# Patient Record
Sex: FEMALE | Race: OTHER | HISPANIC OR LATINO
[De-identification: names, ages, dates, MRNs, and addresses within clinical notes are randomized per-mention and may not be internally consistent; named-entity substitution may affect disease eponyms.]

---

## 2017-02-23 ENCOUNTER — APPOINTMENT (OUTPATIENT)
Dept: FAMILY MEDICINE | Facility: CLINIC | Age: 52
End: 2017-02-23

## 2017-04-10 ENCOUNTER — LABORATORY RESULT (OUTPATIENT)
Age: 52
End: 2017-04-10

## 2017-04-10 ENCOUNTER — APPOINTMENT (OUTPATIENT)
Dept: FAMILY MEDICINE | Facility: CLINIC | Age: 52
End: 2017-04-10

## 2017-04-10 VITALS
BODY MASS INDEX: 37.76 KG/M2 | HEART RATE: 62 BPM | SYSTOLIC BLOOD PRESSURE: 167 MMHG | TEMPERATURE: 97.9 F | WEIGHT: 175 LBS | DIASTOLIC BLOOD PRESSURE: 79 MMHG | HEIGHT: 57 IN | OXYGEN SATURATION: 97 %

## 2017-04-10 DIAGNOSIS — M25.561 PAIN IN RIGHT KNEE: ICD-10-CM

## 2017-04-10 DIAGNOSIS — G89.29 PAIN IN RIGHT KNEE: ICD-10-CM

## 2017-04-13 ENCOUNTER — LABORATORY RESULT (OUTPATIENT)
Age: 52
End: 2017-04-13

## 2017-04-13 ENCOUNTER — RX RENEWAL (OUTPATIENT)
Age: 52
End: 2017-04-13

## 2017-04-13 DIAGNOSIS — E55.9 VITAMIN D DEFICIENCY, UNSPECIFIED: ICD-10-CM

## 2017-04-13 LAB
25(OH)D3 SERPL-MCNC: 16.1 NG/ML
ALBUMIN SERPL ELPH-MCNC: 4.1 G/DL
ALP BLD-CCNC: 65 U/L
ALT SERPL-CCNC: 41 U/L
ANION GAP SERPL CALC-SCNC: 16 MMOL/L
APPEARANCE: CLEAR
AST SERPL-CCNC: 32 U/L
BASOPHILS # BLD AUTO: 0.05 K/UL
BASOPHILS NFR BLD AUTO: 1 %
BILIRUB SERPL-MCNC: 0.5 MG/DL
BILIRUBIN URINE: NEGATIVE
BLOOD URINE: ABNORMAL
BUN SERPL-MCNC: 18 MG/DL
CALCIUM SERPL-MCNC: 8.9 MG/DL
CHLORIDE SERPL-SCNC: 102 MMOL/L
CHOLEST SERPL-MCNC: 195 MG/DL
CHOLEST/HDLC SERPL: 3.1 RATIO
CO2 SERPL-SCNC: 22 MMOL/L
COLOR: YELLOW
CREAT SERPL-MCNC: 0.81 MG/DL
EOSINOPHIL # BLD AUTO: 0.09 K/UL
EOSINOPHIL NFR BLD AUTO: 1.9 %
GLUCOSE QUALITATIVE U: NORMAL MG/DL
GLUCOSE SERPL-MCNC: 106 MG/DL
HCT VFR BLD CALC: 39.2 %
HDLC SERPL-MCNC: 63 MG/DL
HGB BLD-MCNC: 12.9 G/DL
IMM GRANULOCYTES NFR BLD AUTO: 0.2 %
KETONES URINE: NEGATIVE
LDLC SERPL CALC-MCNC: 119 MG/DL
LEUKOCYTE ESTERASE URINE: NEGATIVE
LYMPHOCYTES # BLD AUTO: 2.07 K/UL
LYMPHOCYTES NFR BLD AUTO: 43 %
MAN DIFF?: NORMAL
MCHC RBC-ENTMCNC: 29.4 PG
MCHC RBC-ENTMCNC: 32.9 GM/DL
MCV RBC AUTO: 89.3 FL
MONOCYTES # BLD AUTO: 0.29 K/UL
MONOCYTES NFR BLD AUTO: 6 %
NEUTROPHILS # BLD AUTO: 2.3 K/UL
NEUTROPHILS NFR BLD AUTO: 47.9 %
NITRITE URINE: NEGATIVE
PH URINE: 7
PLATELET # BLD AUTO: 208 K/UL
POTASSIUM SERPL-SCNC: 4.1 MMOL/L
PROT SERPL-MCNC: 7 G/DL
PROTEIN URINE: NEGATIVE MG/DL
RBC # BLD: 4.39 M/UL
RBC # FLD: 14.7 %
SODIUM SERPL-SCNC: 140 MMOL/L
SPECIFIC GRAVITY URINE: 1.02
TRIGL SERPL-MCNC: 65 MG/DL
TSH SERPL-ACNC: 2.63 UIU/ML
UROBILINOGEN URINE: NORMAL MG/DL
WBC # FLD AUTO: 4.81 K/UL

## 2017-05-16 LAB
APPEARANCE: CLEAR
BILIRUBIN URINE: NEGATIVE
BLOOD URINE: ABNORMAL
COLOR: YELLOW
GLUCOSE QUALITATIVE U: NORMAL MG/DL
KETONES URINE: NEGATIVE
LEUKOCYTE ESTERASE URINE: NEGATIVE
NITRITE URINE: NEGATIVE
PH URINE: 6
PROTEIN URINE: NEGATIVE MG/DL
SPECIFIC GRAVITY URINE: 1.02
UROBILINOGEN URINE: 1 MG/DL

## 2017-07-07 ENCOUNTER — APPOINTMENT (OUTPATIENT)
Dept: FAMILY MEDICINE | Facility: CLINIC | Age: 52
End: 2017-07-07

## 2017-07-07 VITALS
TEMPERATURE: 98.3 F | DIASTOLIC BLOOD PRESSURE: 84 MMHG | BODY MASS INDEX: 38.83 KG/M2 | HEIGHT: 57 IN | WEIGHT: 180 LBS | HEART RATE: 68 BPM | OXYGEN SATURATION: 97 % | SYSTOLIC BLOOD PRESSURE: 155 MMHG

## 2017-07-07 VITALS — DIASTOLIC BLOOD PRESSURE: 80 MMHG | SYSTOLIC BLOOD PRESSURE: 140 MMHG

## 2017-07-07 DIAGNOSIS — Z87.440 PERSONAL HISTORY OF URINARY (TRACT) INFECTIONS: ICD-10-CM

## 2017-07-10 LAB
BACTERIA UR CULT: NORMAL
BILIRUB UR QL STRIP: NEGATIVE
GLUCOSE UR-MCNC: NEGATIVE
HCG UR QL: 0.2 EU/DL
HGB UR QL STRIP.AUTO: NORMAL
KETONES UR-MCNC: NEGATIVE
LEUKOCYTE ESTERASE UR QL STRIP: NEGATIVE
NITRITE UR QL STRIP: NEGATIVE
PH UR STRIP: 5
PROT UR STRIP-MCNC: NEGATIVE
SP GR UR STRIP: 1

## 2018-01-29 ENCOUNTER — APPOINTMENT (OUTPATIENT)
Dept: FAMILY MEDICINE | Facility: CLINIC | Age: 53
End: 2018-01-29
Payer: COMMERCIAL

## 2018-01-29 VITALS
HEART RATE: 65 BPM | TEMPERATURE: 98 F | SYSTOLIC BLOOD PRESSURE: 181 MMHG | BODY MASS INDEX: 38.62 KG/M2 | WEIGHT: 179 LBS | DIASTOLIC BLOOD PRESSURE: 84 MMHG | OXYGEN SATURATION: 97 % | HEIGHT: 57 IN

## 2018-01-29 VITALS — DIASTOLIC BLOOD PRESSURE: 94 MMHG | SYSTOLIC BLOOD PRESSURE: 182 MMHG

## 2018-01-29 PROCEDURE — 99214 OFFICE O/P EST MOD 30 MIN: CPT

## 2018-01-29 RX ORDER — CELECOXIB 200 MG/1
200 CAPSULE ORAL DAILY
Qty: 90 | Refills: 3 | Status: DISCONTINUED | COMMUNITY
Start: 2017-04-10 | End: 2018-01-29

## 2018-02-07 ENCOUNTER — APPOINTMENT (OUTPATIENT)
Dept: FAMILY MEDICINE | Facility: CLINIC | Age: 53
End: 2018-02-07
Payer: COMMERCIAL

## 2018-02-07 VITALS
BODY MASS INDEX: 38.83 KG/M2 | OXYGEN SATURATION: 99 % | HEART RATE: 76 BPM | WEIGHT: 180 LBS | SYSTOLIC BLOOD PRESSURE: 160 MMHG | DIASTOLIC BLOOD PRESSURE: 72 MMHG | TEMPERATURE: 97.7 F | HEIGHT: 57 IN

## 2018-02-07 PROCEDURE — G0008: CPT

## 2018-02-07 PROCEDURE — 90686 IIV4 VACC NO PRSV 0.5 ML IM: CPT

## 2018-02-07 PROCEDURE — 99213 OFFICE O/P EST LOW 20 MIN: CPT | Mod: 25

## 2018-03-23 ENCOUNTER — RX RENEWAL (OUTPATIENT)
Age: 53
End: 2018-03-23

## 2018-04-26 ENCOUNTER — APPOINTMENT (OUTPATIENT)
Dept: CARDIOLOGY | Facility: CLINIC | Age: 53
End: 2018-04-26
Payer: COMMERCIAL

## 2018-04-26 VITALS
HEIGHT: 55 IN | BODY MASS INDEX: 41.66 KG/M2 | DIASTOLIC BLOOD PRESSURE: 90 MMHG | SYSTOLIC BLOOD PRESSURE: 150 MMHG | HEART RATE: 62 BPM | WEIGHT: 180 LBS

## 2018-04-26 PROCEDURE — 93224 XTRNL ECG REC UP TO 48 HRS: CPT

## 2018-04-26 PROCEDURE — 93000 ELECTROCARDIOGRAM COMPLETE: CPT | Mod: 58

## 2018-04-26 PROCEDURE — 99204 OFFICE O/P NEW MOD 45 MIN: CPT

## 2018-06-01 ENCOUNTER — LABORATORY RESULT (OUTPATIENT)
Age: 53
End: 2018-06-01

## 2018-06-01 ENCOUNTER — APPOINTMENT (OUTPATIENT)
Dept: FAMILY MEDICINE | Facility: CLINIC | Age: 53
End: 2018-06-01
Payer: COMMERCIAL

## 2018-06-01 VITALS
OXYGEN SATURATION: 98 % | BODY MASS INDEX: 54.01 KG/M2 | DIASTOLIC BLOOD PRESSURE: 79 MMHG | HEART RATE: 66 BPM | TEMPERATURE: 98.4 F | WEIGHT: 177 LBS | SYSTOLIC BLOOD PRESSURE: 173 MMHG

## 2018-06-01 PROCEDURE — 99396 PREV VISIT EST AGE 40-64: CPT | Mod: 25

## 2018-06-01 PROCEDURE — 36415 COLL VENOUS BLD VENIPUNCTURE: CPT

## 2018-06-01 PROCEDURE — G0444 DEPRESSION SCREEN ANNUAL: CPT

## 2018-06-01 PROCEDURE — G0447 BEHAVIOR COUNSEL OBESITY 15M: CPT

## 2018-06-01 RX ORDER — LOSARTAN POTASSIUM 50 MG/1
50 TABLET, FILM COATED ORAL
Qty: 30 | Refills: 0 | Status: DISCONTINUED | COMMUNITY
Start: 2018-01-29 | End: 2018-06-01

## 2018-06-01 NOTE — HISTORY OF PRESENT ILLNESS
[FreeTextEntry1] : Annual physical. [de-identified] : 51 y/o HF, originally from [facility], presents today for annual physical.\par \par Pt w/ hx HTN on losartan, recently evaluated by cardiology due to CP and palpitations. Pt had Holter: WNL.\par \par Pt w/ Depression. States has chronic insomnia, and takes Rivot[place] that brings from her country.\par Pt is poor historian.

## 2018-06-01 NOTE — HEALTH RISK ASSESSMENT
[Poor] : ~his/her~ mood as  poor [No falls in past year] : Patient reported no falls in the past year [3] : 2) Feeling down, depressed, or hopeless for nearly every day (3) [Patient reported PAP Smear was normal] : Patient reported PAP Smear was normal [Patient reported colonoscopy was normal] : Patient reported colonoscopy was normal [HIV test declined] : HIV test declined [Hepatitis C test offered] : Hepatitis C test offered [Language] : difficulty with language [Reasoning] : difficulty with reasoning [Access to Medications] : access to medications [With Family] : lives with family [Employed] : employed [] :  [# Of Children ___] : has [unfilled] children [Feels Safe at Home] : Feels safe at home [Fully functional (bathing, dressing, toileting, transferring, walking, feeding)] : Fully functional (bathing, dressing, toileting, transferring, walking, feeding) [Fully functional (using the telephone, shopping, preparing meals, housekeeping, doing laundry, using] : Fully functional and needs no help or supervision to perform IADLs (using the telephone, shopping, preparing meals, housekeeping, doing laundry, using transportation, managing medications and managing finances) [Smoke Detector] : smoke detector [Seat Belt] :  uses seat belt [] : No [Change in mental status noted] : No change in mental status noted [Behavior] : denies difficulty with behavior [Handling Complex Tasks] : denies difficulty handling complex tasks [Sexually Active] : not sexually active [Reports changes in hearing] : Reports no changes in hearing [Reports changes in vision] : Reports no changes in vision [Reports changes in dental health] : Reports no changes in dental health [PapSmearDate] : 2015 [ColonoscopyDate] : 2016 [ColonoscopyComments] : Dr Carrero [HIVComments] : States was done 5 years ago, negative. 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

## 2018-06-01 NOTE — COUNSELING
[Weight management counseling provided] : Weight management [Healthy eating counseling provided] : healthy eating [Activity counseling provided] : activity [Behavioral health counseling provided] : behavioral health  [Keep Food Diary] : Keep food diary [Decrease Portions] : Decrease food portions [Low Salt Diet] : Low salt diet [Walking] : Walking [None] : None [Good understanding] : Patient has a good understanding of lifestyle changes and the steps needed to achieve self management goals [ - Annual Depression Screening] : Annual Depression Screening [ - Behavioral Counseling for Obesity (Face-to-Face for 15 Minutes)] : Behavioral Counseling for Obesity (Face-to-Face for 15 Minutes)

## 2018-06-01 NOTE — PAST MEDICAL HISTORY
[Postmenopausal] : postmenopausal [Menopause Age____] : age at menopause was [unfilled] [Total Preg ___] : G[unfilled] [Living ___] : Living: [unfilled]

## 2018-06-01 NOTE — REVIEW OF SYSTEMS
[Insomnia] : insomnia [Anxiety] : anxiety [Depression] : depression [Heartburn] : heartburn [Suicidal] : not suicidal

## 2018-06-01 NOTE — ASSESSMENT
[FreeTextEntry1] : 53 y/o HF, from [facility], poor historian and poor comprehension:\par \par CPE: blood and UA today.\par \par HIV screening: refused\par \par HC screening\par \par HTN: not well control. D/C losartan 50mg. Start Losratan-HCTZ 50-12.5mg.\par \par GERD: on omeprazole prn. Diet advise.\par \par Obesity: TLC. Diet counseling and guidance. Counseling was provided in diet and exercise. TLC d/w pt in details, regarding benefits of low calories and portion control. Spend > 15 minJun 01, 2018  10:00\par \par \par Gyn: pt will schedule appointment.\par \par Colonoscopy: 2016.\par \par Depression: Counseling and guidance. Start Sertraline. Will f/up in 1  month.

## 2018-06-08 LAB
25(OH)D3 SERPL-MCNC: 16.6 NG/ML
ALBUMIN SERPL ELPH-MCNC: 4.1 G/DL
ALP BLD-CCNC: 62 U/L
ALT SERPL-CCNC: 47 U/L
ANION GAP SERPL CALC-SCNC: 13 MMOL/L
APPEARANCE: CLEAR
AST SERPL-CCNC: 37 U/L
BASOPHILS # BLD AUTO: 0.04 K/UL
BASOPHILS NFR BLD AUTO: 0.9 %
BILIRUB SERPL-MCNC: 0.6 MG/DL
BILIRUBIN URINE: NEGATIVE
BLOOD URINE: ABNORMAL
BUN SERPL-MCNC: 15 MG/DL
CALCIUM SERPL-MCNC: 9.8 MG/DL
CHLORIDE SERPL-SCNC: 102 MMOL/L
CHOLEST SERPL-MCNC: 193 MG/DL
CHOLEST/HDLC SERPL: 2.9 RATIO
CO2 SERPL-SCNC: 25 MMOL/L
COLOR: YELLOW
CREAT SERPL-MCNC: 0.71 MG/DL
EOSINOPHIL # BLD AUTO: 0.05 K/UL
EOSINOPHIL NFR BLD AUTO: 1.1 %
GLUCOSE QUALITATIVE U: NEGATIVE MG/DL
GLUCOSE SERPL-MCNC: 106 MG/DL
HCT VFR BLD CALC: 40.6 %
HCV AB SER QL: NONREACTIVE
HCV S/CO RATIO: 0.14 S/CO
HDLC SERPL-MCNC: 67 MG/DL
HGB BLD-MCNC: 13.3 G/DL
IMM GRANULOCYTES NFR BLD AUTO: 0 %
KETONES URINE: NEGATIVE
LDLC SERPL CALC-MCNC: 114 MG/DL
LEUKOCYTE ESTERASE URINE: NEGATIVE
LYMPHOCYTES # BLD AUTO: 1.89 K/UL
LYMPHOCYTES NFR BLD AUTO: 42.2 %
MAN DIFF?: NORMAL
MCHC RBC-ENTMCNC: 29.2 PG
MCHC RBC-ENTMCNC: 32.8 GM/DL
MCV RBC AUTO: 89 FL
MONOCYTES # BLD AUTO: 0.27 K/UL
MONOCYTES NFR BLD AUTO: 6 %
NEUTROPHILS # BLD AUTO: 2.23 K/UL
NEUTROPHILS NFR BLD AUTO: 49.8 %
NITRITE URINE: NEGATIVE
PH URINE: 6.5
PLATELET # BLD AUTO: 211 K/UL
POTASSIUM SERPL-SCNC: 4.5 MMOL/L
PROT SERPL-MCNC: 7.7 G/DL
PROTEIN URINE: NEGATIVE MG/DL
RBC # BLD: 4.56 M/UL
RBC # FLD: 14.5 %
SODIUM SERPL-SCNC: 140 MMOL/L
SPECIFIC GRAVITY URINE: 1.02
TRIGL SERPL-MCNC: 61 MG/DL
TSH SERPL-ACNC: 1.27 UIU/ML
UROBILINOGEN URINE: NEGATIVE MG/DL
WBC # FLD AUTO: 4.48 K/UL

## 2018-06-18 ENCOUNTER — APPOINTMENT (OUTPATIENT)
Dept: FAMILY MEDICINE | Facility: CLINIC | Age: 53
End: 2018-06-18
Payer: COMMERCIAL

## 2018-06-18 VITALS
OXYGEN SATURATION: 98 % | DIASTOLIC BLOOD PRESSURE: 82 MMHG | WEIGHT: 176 LBS | BODY MASS INDEX: 37.97 KG/M2 | HEART RATE: 66 BPM | HEIGHT: 57 IN | SYSTOLIC BLOOD PRESSURE: 143 MMHG | TEMPERATURE: 98.4 F

## 2018-06-18 DIAGNOSIS — Z01.419 ENCOUNTER FOR GYNECOLOGICAL EXAMINATION (GENERAL) (ROUTINE) W/OUT ABNORMAL FINDINGS: ICD-10-CM

## 2018-06-18 PROCEDURE — 99214 OFFICE O/P EST MOD 30 MIN: CPT | Mod: 25

## 2018-06-18 RX ORDER — ERGOCALCIFEROL 1.25 MG/1
1.25 MG CAPSULE, LIQUID FILLED ORAL
Qty: 12 | Refills: 0 | Status: ACTIVE | COMMUNITY
Start: 2017-04-13 | End: 1900-01-01

## 2018-06-20 LAB — HPV HIGH+LOW RISK DNA PNL CVX: NOT DETECTED

## 2018-06-27 DIAGNOSIS — Z86.19 PERSONAL HISTORY OF OTHER INFECTIOUS AND PARASITIC DISEASES: ICD-10-CM

## 2018-06-27 LAB — CYTOLOGY CVX/VAG DOC THIN PREP: NORMAL

## 2018-09-28 ENCOUNTER — RX RENEWAL (OUTPATIENT)
Age: 53
End: 2018-09-28

## 2018-10-10 ENCOUNTER — APPOINTMENT (OUTPATIENT)
Dept: FAMILY MEDICINE | Facility: CLINIC | Age: 53
End: 2018-10-10
Payer: COMMERCIAL

## 2018-10-10 ENCOUNTER — LABORATORY RESULT (OUTPATIENT)
Age: 53
End: 2018-10-10

## 2018-10-10 ENCOUNTER — MED ADMIN CHARGE (OUTPATIENT)
Age: 53
End: 2018-10-10

## 2018-10-10 VITALS
WEIGHT: 176 LBS | SYSTOLIC BLOOD PRESSURE: 120 MMHG | OXYGEN SATURATION: 99 % | HEART RATE: 65 BPM | BODY MASS INDEX: 37.97 KG/M2 | TEMPERATURE: 97.9 F | HEIGHT: 57 IN | DIASTOLIC BLOOD PRESSURE: 74 MMHG

## 2018-10-10 DIAGNOSIS — R53.83 OTHER MALAISE: ICD-10-CM

## 2018-10-10 DIAGNOSIS — R53.81 OTHER MALAISE: ICD-10-CM

## 2018-10-10 PROCEDURE — 90686 IIV4 VACC NO PRSV 0.5 ML IM: CPT

## 2018-10-10 PROCEDURE — 36415 COLL VENOUS BLD VENIPUNCTURE: CPT

## 2018-10-10 PROCEDURE — 99214 OFFICE O/P EST MOD 30 MIN: CPT | Mod: 25

## 2018-10-10 PROCEDURE — G0008: CPT

## 2018-10-10 RX ORDER — FLUCONAZOLE 150 MG/1
150 TABLET ORAL
Qty: 1 | Refills: 0 | Status: DISCONTINUED | COMMUNITY
Start: 2018-06-27 | End: 2018-10-10

## 2018-10-10 NOTE — REVIEW OF SYSTEMS
[Fatigue] : fatigue [Muscle Weakness] : muscle weakness [Headache] : headache [Dizziness] : dizziness [Confusion] : confusion [Fainting] : no fainting [Unsteady Walking] : no ataxia [FreeTextEntry9] : cervicalgia

## 2018-10-10 NOTE — HISTORY OF PRESENT ILLNESS
[FreeTextEntry1] : Not feeling well [de-identified] : Pt presents today c/o not feeling well. Tired, fatigue, blurred vision, and pain in shoulders and cervical area.Reports body aches and pain in Rt knee.\par Pt states has palpitations, and feels occasional dizzy.\par States had OTC med for pain with mild relived of symptoms.\par Feels ltired, weakness and needs to sleep all the time.\par Pt denies recent travel or sick contacts.\par \par Pt states symptoms are present for aprox 1 week.\par Similar episodes in the past, but reports this time is worse.

## 2018-10-10 NOTE — ASSESSMENT
[FreeTextEntry1] : 53 y/o HF, originally from [facility], poor historian, presents today not feeling well overall;\par \par -Complete blood tests to R/O Rheumatologic or organic disorder.\par -EKG: NSR\par \par Microscopic hematuria;\par -referral was given to urology. never done.\par -New referral today\par \par HTN;\par -on losartan\par \par GERD; \par -omeprazole\par \par Depression;\par -sertaline\par \par Gyn; 2018\par \par Colonsocopy;2016\par \par Mammography; referral.\par \par Flu shot today.\par \par

## 2018-10-11 LAB
ALBUMIN SERPL ELPH-MCNC: 4.8 G/DL
ALP BLD-CCNC: 54 U/L
ALT SERPL-CCNC: 45 U/L
ANION GAP SERPL CALC-SCNC: 12 MMOL/L
AST SERPL-CCNC: 36 U/L
B BURGDOR IGG+IGM SER QL IB: NORMAL
BASOPHILS # BLD AUTO: 0.05 K/UL
BASOPHILS NFR BLD AUTO: 1 %
BILIRUB SERPL-MCNC: 0.8 MG/DL
BUN SERPL-MCNC: 14 MG/DL
CALCIUM SERPL-MCNC: 9.5 MG/DL
CHLORIDE SERPL-SCNC: 103 MMOL/L
CO2 SERPL-SCNC: 28 MMOL/L
CREAT SERPL-MCNC: 0.73 MG/DL
CRP SERPL-MCNC: 0.28 MG/DL
EBV EA AB SER IA-ACNC: <5 U/ML
EBV EA AB TITR SER IF: POSITIVE
EBV EA IGG SER QL IA: 584 U/ML
EBV EA IGG SER-ACNC: NEGATIVE
EBV EA IGM SER IA-ACNC: NEGATIVE
EBV PATRN SPEC IB-IMP: NORMAL
EBV VCA IGG SER IA-ACNC: 293 U/ML
EBV VCA IGM SER QL IA: <10 U/ML
EOSINOPHIL # BLD AUTO: 0.06 K/UL
EOSINOPHIL NFR BLD AUTO: 1.2 %
EPSTEIN-BARR VIRUS CAPSID ANTIGEN IGG: POSITIVE
ERYTHROCYTE [SEDIMENTATION RATE] IN BLOOD BY WESTERGREN METHOD: 13 MM/HR
FOLATE SERPL-MCNC: >20 NG/ML
GLUCOSE SERPL-MCNC: 103 MG/DL
HCT VFR BLD CALC: 42.6 %
HGB BLD-MCNC: 14.1 G/DL
IMM GRANULOCYTES NFR BLD AUTO: 0.2 %
LYMPHOCYTES # BLD AUTO: 2.45 K/UL
LYMPHOCYTES NFR BLD AUTO: 48.9 %
MAN DIFF?: NORMAL
MCHC RBC-ENTMCNC: 30.8 PG
MCHC RBC-ENTMCNC: 33.1 GM/DL
MCV RBC AUTO: 93 FL
MONOCYTES # BLD AUTO: 0.33 K/UL
MONOCYTES NFR BLD AUTO: 6.6 %
NEUTROPHILS # BLD AUTO: 2.11 K/UL
NEUTROPHILS NFR BLD AUTO: 42.1 %
PLATELET # BLD AUTO: 203 K/UL
POTASSIUM SERPL-SCNC: 4.4 MMOL/L
PROT SERPL-MCNC: 7.8 G/DL
RBC # BLD: 4.58 M/UL
RBC # FLD: 14.9 %
RHEUMATOID FACT SER QL: <10 IU/ML
SODIUM SERPL-SCNC: 143 MMOL/L
TSH SERPL-ACNC: 1.33 UIU/ML
VIT B12 SERPL-MCNC: 693 PG/ML
WBC # FLD AUTO: 5.01 K/UL

## 2018-10-15 LAB — ANA SER IF-ACNC: NEGATIVE

## 2018-10-27 ENCOUNTER — APPOINTMENT (OUTPATIENT)
Dept: MAMMOGRAPHY | Facility: CLINIC | Age: 53
End: 2018-10-27
Payer: COMMERCIAL

## 2018-10-27 ENCOUNTER — OUTPATIENT (OUTPATIENT)
Dept: OUTPATIENT SERVICES | Facility: HOSPITAL | Age: 53
LOS: 1 days | End: 2018-10-27
Payer: COMMERCIAL

## 2018-10-27 DIAGNOSIS — Z00.8 ENCOUNTER FOR OTHER GENERAL EXAMINATION: ICD-10-CM

## 2018-10-27 PROCEDURE — 77063 BREAST TOMOSYNTHESIS BI: CPT | Mod: 26

## 2018-10-27 PROCEDURE — 77067 SCR MAMMO BI INCL CAD: CPT | Mod: 26

## 2018-10-27 PROCEDURE — 77063 BREAST TOMOSYNTHESIS BI: CPT

## 2018-10-27 PROCEDURE — 77067 SCR MAMMO BI INCL CAD: CPT

## 2018-10-29 ENCOUNTER — APPOINTMENT (OUTPATIENT)
Dept: FAMILY MEDICINE | Facility: CLINIC | Age: 53
End: 2018-10-29
Payer: COMMERCIAL

## 2018-10-29 VITALS
TEMPERATURE: 97.8 F | BODY MASS INDEX: 37.32 KG/M2 | HEIGHT: 57 IN | SYSTOLIC BLOOD PRESSURE: 157 MMHG | WEIGHT: 173 LBS | HEART RATE: 61 BPM | DIASTOLIC BLOOD PRESSURE: 81 MMHG | OXYGEN SATURATION: 98 %

## 2018-10-29 DIAGNOSIS — R53.83 OTHER MALAISE: ICD-10-CM

## 2018-10-29 DIAGNOSIS — M54.2 CERVICALGIA: ICD-10-CM

## 2018-10-29 DIAGNOSIS — G43.909 MIGRAINE, UNSPECIFIED, NOT INTRACTABLE, W/OUT STATUS MIGRAINOSUS: ICD-10-CM

## 2018-10-29 DIAGNOSIS — M54.9 DORSALGIA, UNSPECIFIED: ICD-10-CM

## 2018-10-29 DIAGNOSIS — R53.81 OTHER MALAISE: ICD-10-CM

## 2018-10-29 PROCEDURE — 99214 OFFICE O/P EST MOD 30 MIN: CPT

## 2018-10-29 NOTE — ASSESSMENT
[FreeTextEntry1] : 51 y/o HF, originally from [facility], poor historian, presents today not feeling well overall;\par \par -fatigue/ malaise: Fibromyalgia?\par Complete blood tests to R/O Rheumatologic or organic disorder.: all wnl.\par -Rheumatology referral.\par -EKG: NSR\par \par Microscopic hematuria;\par -referral was given to urology. never done.\par -New referral today\par \par HTN;\par -on losartan\par \par GERD; \par -omeprazole\par \par Depression;\par -sertraline\par \par Gyn; 2018\par \par Colonsocopy;2016\par \par Mammography; done 10/27/18\par \par Flu shot : up to date.\par \par Migraines: Start sumatriptan 25mg prn\par \par

## 2018-10-29 NOTE — HISTORY OF PRESENT ILLNESS
[FreeTextEntry1] : f/up in blood tests results [de-identified] : Pt presents today for f/up in blood tests results.\par persist  not feeling well. Tired, fatigue, blurred vision, and pain in shoulders and cervical area.Reports body aches and pain in Rt knee.\par Pt states has palpitations, and feels occasional dizzy.\par States had OTC med for pain with mild relived of symptoms.\par Feels tired, weakness and needs to sleep all the time.\par Pt denies recent travel or sick contacts.\par \par All this symptoms are presents > 1year. Increase with cold weather.\par Similar episodes in the past, but reports this time is worse.

## 2018-11-21 ENCOUNTER — APPOINTMENT (OUTPATIENT)
Dept: UROLOGY | Facility: CLINIC | Age: 53
End: 2018-11-21
Payer: COMMERCIAL

## 2018-11-21 VITALS
BODY MASS INDEX: 37.32 KG/M2 | SYSTOLIC BLOOD PRESSURE: 146 MMHG | WEIGHT: 173 LBS | RESPIRATION RATE: 16 BRPM | HEIGHT: 57 IN | TEMPERATURE: 98.1 F | DIASTOLIC BLOOD PRESSURE: 80 MMHG | HEART RATE: 68 BPM | OXYGEN SATURATION: 98 %

## 2018-11-21 PROCEDURE — 51798 US URINE CAPACITY MEASURE: CPT

## 2018-11-21 PROCEDURE — 99204 OFFICE O/P NEW MOD 45 MIN: CPT | Mod: 25

## 2018-11-23 LAB
APPEARANCE: CLEAR
BACTERIA UR CULT: NORMAL
BACTERIA: NEGATIVE
BILIRUBIN URINE: NEGATIVE
BLOOD URINE: ABNORMAL
COLOR: YELLOW
GLUCOSE QUALITATIVE U: NEGATIVE MG/DL
HYALINE CASTS: 1 /LPF
KETONES URINE: NEGATIVE
LEUKOCYTE ESTERASE URINE: ABNORMAL
MICROSCOPIC-UA: NORMAL
NITRITE URINE: NEGATIVE
PH URINE: 6
PROTEIN URINE: NEGATIVE MG/DL
RED BLOOD CELLS URINE: 1 /HPF
SPECIFIC GRAVITY URINE: 1.01
SQUAMOUS EPITHELIAL CELLS: 2 /HPF
UROBILINOGEN URINE: NEGATIVE MG/DL
WHITE BLOOD CELLS URINE: 2 /HPF

## 2018-12-05 ENCOUNTER — APPOINTMENT (OUTPATIENT)
Dept: UROLOGY | Facility: CLINIC | Age: 53
End: 2018-12-05
Payer: COMMERCIAL

## 2018-12-05 DIAGNOSIS — R31.29 OTHER MICROSCOPIC HEMATURIA: ICD-10-CM

## 2018-12-05 PROCEDURE — 52000 CYSTOURETHROSCOPY: CPT

## 2018-12-09 ENCOUNTER — FORM ENCOUNTER (OUTPATIENT)
Age: 53
End: 2018-12-09

## 2018-12-10 ENCOUNTER — OUTPATIENT (OUTPATIENT)
Dept: OUTPATIENT SERVICES | Facility: HOSPITAL | Age: 53
LOS: 1 days | End: 2018-12-10

## 2018-12-10 ENCOUNTER — APPOINTMENT (OUTPATIENT)
Dept: ULTRASOUND IMAGING | Facility: CLINIC | Age: 53
End: 2018-12-10
Payer: COMMERCIAL

## 2018-12-10 DIAGNOSIS — R31.29 OTHER MICROSCOPIC HEMATURIA: ICD-10-CM

## 2018-12-10 PROCEDURE — 76830 TRANSVAGINAL US NON-OB: CPT | Mod: 26

## 2018-12-10 PROCEDURE — 76856 US EXAM PELVIC COMPLETE: CPT | Mod: 26

## 2018-12-10 PROCEDURE — 76775 US EXAM ABDO BACK WALL LIM: CPT | Mod: 26

## 2019-01-25 ENCOUNTER — RX RENEWAL (OUTPATIENT)
Age: 54
End: 2019-01-25

## 2019-02-04 ENCOUNTER — APPOINTMENT (OUTPATIENT)
Dept: FAMILY MEDICINE | Facility: CLINIC | Age: 54
End: 2019-02-04
Payer: COMMERCIAL

## 2019-02-04 VITALS
WEIGHT: 175 LBS | HEIGHT: 57 IN | DIASTOLIC BLOOD PRESSURE: 80 MMHG | HEART RATE: 80 BPM | OXYGEN SATURATION: 98 % | BODY MASS INDEX: 37.76 KG/M2 | TEMPERATURE: 98 F | SYSTOLIC BLOOD PRESSURE: 135 MMHG

## 2019-02-04 DIAGNOSIS — N81.4 UTEROVAGINAL PROLAPSE, UNSPECIFIED: ICD-10-CM

## 2019-02-04 PROCEDURE — 99213 OFFICE O/P EST LOW 20 MIN: CPT

## 2019-02-04 NOTE — HISTORY OF PRESENT ILLNESS
[FreeTextEntry1] : f/up after seen by urology [de-identified] : Pt is a poor hx and difficulty to verbalize.\par Pt was seen by urology for evaluation of microscopic hematuria. Cystoscopy done  w/ eleavted PRV.\par Pt had USG: Endometrial thickening and Myomatous Uterus. Pt was advise to have gyn eval.\par persist  not feeling well. Tired, fatigue, blurred vision, and pain in shoulders and cervical area.Reports body aches and pain in left anterior chest.\par Pt states has palpitations, and feels occasional dizzy.\par States had OTC med for pain with mild relived of symptoms.\par All this symptoms are presents > 1year. Increase with cold weather.\par Similar episodes in the past, but reports this time is worse.\par Reports chronic constipation

## 2019-02-04 NOTE — ASSESSMENT
[FreeTextEntry1] : 53 y/o HF, originally from [facility], poor historian, presents today not feeling well overall;\par \par -fatigue/ malaise: Fibromyalgia?\par -Pending Rheumatology eval.\par -EKG (10/2018): NSR\par \par Microscopic hematuria;\par -Seen by urology.\par -Cystoscopy done, USG done and advise Gyn evaluation.\par \par Myomatous uterine/ Endometrial thickening:\par -Gyn referral.\par \par HTN;\par -on losartan\par \par GERD; \par -omeprazole\par \par Depression;\par -sertraline\par \par Chronic constipation:\par -On docusate.\par -secondary to Uterine Myoma?\par Gyn; 2018\par \par Colonoscopy;2016\par \par Mammography; done 10/27/18\par \par Flu shot : up to date.\par \par Migraines: Start sumatriptan 25mg prn\par \par

## 2019-02-24 ENCOUNTER — RX RENEWAL (OUTPATIENT)
Age: 54
End: 2019-02-24

## 2019-03-04 ENCOUNTER — RX RENEWAL (OUTPATIENT)
Age: 54
End: 2019-03-04

## 2019-06-05 ENCOUNTER — APPOINTMENT (OUTPATIENT)
Dept: UROLOGY | Facility: CLINIC | Age: 54
End: 2019-06-05

## 2019-06-17 ENCOUNTER — RX RENEWAL (OUTPATIENT)
Age: 54
End: 2019-06-17

## 2019-07-10 ENCOUNTER — RX RENEWAL (OUTPATIENT)
Age: 54
End: 2019-07-10

## 2019-10-07 ENCOUNTER — MED ADMIN CHARGE (OUTPATIENT)
Age: 54
End: 2019-10-07

## 2019-10-07 ENCOUNTER — APPOINTMENT (OUTPATIENT)
Dept: FAMILY MEDICINE | Facility: CLINIC | Age: 54
End: 2019-10-07
Payer: COMMERCIAL

## 2019-10-07 VITALS
DIASTOLIC BLOOD PRESSURE: 79 MMHG | HEIGHT: 57 IN | TEMPERATURE: 98.4 F | BODY MASS INDEX: 39.48 KG/M2 | OXYGEN SATURATION: 97 % | HEART RATE: 72 BPM | WEIGHT: 183 LBS | SYSTOLIC BLOOD PRESSURE: 118 MMHG

## 2019-10-07 PROCEDURE — 99396 PREV VISIT EST AGE 40-64: CPT | Mod: 25

## 2019-10-07 PROCEDURE — 93000 ELECTROCARDIOGRAM COMPLETE: CPT

## 2019-10-07 PROCEDURE — 36415 COLL VENOUS BLD VENIPUNCTURE: CPT

## 2019-10-07 PROCEDURE — G0008: CPT

## 2019-10-07 PROCEDURE — 90674 CCIIV4 VAC NO PRSV 0.5 ML IM: CPT

## 2019-10-07 RX ORDER — SERTRALINE 25 MG/1
25 TABLET, FILM COATED ORAL DAILY
Qty: 30 | Refills: 3 | Status: DISCONTINUED | COMMUNITY
Start: 2018-06-01 | End: 2019-10-07

## 2019-10-07 NOTE — PHYSICAL EXAM
[No Acute Distress] : no acute distress [Well Nourished] : well nourished [Well-Appearing] : well-appearing [Normal Sclera/Conjunctiva] : normal sclera/conjunctiva [Well Developed] : well developed [PERRL] : pupils equal round and reactive to light [EOMI] : extraocular movements intact [Normal Oropharynx] : the oropharynx was normal [Normal Outer Ear/Nose] : the outer ears and nose were normal in appearance [No JVD] : no jugular venous distention [No Lymphadenopathy] : no lymphadenopathy [Supple] : supple [Thyroid Normal, No Nodules] : the thyroid was normal and there were no nodules present [No Respiratory Distress] : no respiratory distress  [Normal Rate] : normal rate  [Clear to Auscultation] : lungs were clear to auscultation bilaterally [No Accessory Muscle Use] : no accessory muscle use [Normal S1, S2] : normal S1 and S2 [Regular Rhythm] : with a regular rhythm [No Abdominal Bruit] : a ~M bruit was not heard ~T in the abdomen [No Murmur] : no murmur heard [No Carotid Bruits] : no carotid bruits [Pedal Pulses Present] : the pedal pulses are present [No Varicosities] : no varicosities [No Edema] : there was no peripheral edema [No Extremity Clubbing/Cyanosis] : no extremity clubbing/cyanosis [No Palpable Aorta] : no palpable aorta [Soft] : abdomen soft [Non Tender] : non-tender [No Masses] : no abdominal mass palpated [Non-distended] : non-distended [No HSM] : no HSM [Normal Bowel Sounds] : normal bowel sounds [Normal Posterior Cervical Nodes] : no posterior cervical lymphadenopathy [Normal Anterior Cervical Nodes] : no anterior cervical lymphadenopathy [No CVA Tenderness] : no CVA  tenderness [No Spinal Tenderness] : no spinal tenderness [No Joint Swelling] : no joint swelling [No Rash] : no rash [Grossly Normal Strength/Tone] : grossly normal strength/tone [Normal Gait] : normal gait [No Focal Deficits] : no focal deficits [Coordination Grossly Intact] : coordination grossly intact [Deep Tendon Reflexes (DTR)] : deep tendon reflexes were 2+ and symmetric [Normal Affect] : the affect was normal [Normal Insight/Judgement] : insight and judgment were intact

## 2019-10-07 NOTE — HISTORY OF PRESENT ILLNESS
[FreeTextEntry1] : Annual physical. [de-identified] : 52 y/o HF, originally from [facility], presents today for annual physical.\par \par Pt w/ hx HTN on losartan. Pt had Holter 2018, seen by cardiology.: WNL.\par \par Pt is very poor historian.\par Pt was seen last year 11/2018 by urology for Recurrent UTI, pelvic prolapse and microscopic hematuria. Pt had USG and was advise to f/up with GYn, after abnormal Pelvic USG.

## 2019-10-07 NOTE — COUNSELING
[Weight management counseling provided] : Weight management [Healthy eating counseling provided] : healthy eating [Activity counseling provided] : activity [Keep Food Diary] : Keep food diary [Decrease Portions] : Decrease food portions [Low Salt Diet] : Low salt diet [None] : None [Walking] : Walking [Good understanding] : Patient has a good understanding of lifestyle changes and the steps needed to achieve self management goals [Fall prevention counseling provided] : Fall prevention counseling provided [Behavioral health counseling provided] : Behavioral health counseling provided [Other: ____] : [unfilled] [Needs reinforcement, provided] : Patient needs reinforcement on understanding of lifestyle changes and steps needed to achieve self management goal; reinforcement was provided

## 2019-10-07 NOTE — REVIEW OF SYSTEMS
[Heartburn] : heartburn [Anxiety] : anxiety [Insomnia] : insomnia [Depression] : depression [Suicidal] : not suicidal

## 2019-10-07 NOTE — HEALTH RISK ASSESSMENT
[Fair] :  ~his/her~ mood as fair [No] : No [No falls in past year] : Patient reported no falls in the past year [0] : 1) Little interest or pleasure doing things: Not at all (0) [Patient reported mammogram was normal] : Patient reported mammogram was normal [Patient reported PAP Smear was normal] : Patient reported PAP Smear was normal [Patient reported colonoscopy was normal] : Patient reported colonoscopy was normal [HIV test declined] : HIV test declined [Hepatitis C test offered] : Hepatitis C test offered [Language] : difficulty with language [Reasoning] : difficulty with reasoning [Access to Medications] : access to medications [With Family] : lives with family [Employed] : employed [] :  [# Of Children ___] : has [unfilled] children [Feels Safe at Home] : Feels safe at home [Fully functional (using the telephone, shopping, preparing meals, housekeeping, doing laundry, using] : Fully functional and needs no help or supervision to perform IADLs (using the telephone, shopping, preparing meals, housekeeping, doing laundry, using transportation, managing medications and managing finances) [Fully functional (bathing, dressing, toileting, transferring, walking, feeding)] : Fully functional (bathing, dressing, toileting, transferring, walking, feeding) [Seat Belt] :  uses seat belt [Smoke Detector] : smoke detector [Patient/Caregiver not ready to engage] : Patient/Caregiver not ready to engage [] : No [de-identified] : no [de-identified] : no [Change in mental status noted] : No change in mental status noted [Behavior] : denies difficulty with behavior [Handling Complex Tasks] : denies difficulty handling complex tasks [Sexually Active] : not sexually active [Reports changes in hearing] : Reports no changes in hearing [Reports changes in vision] : Reports no changes in vision [Reports changes in dental health] : Reports no changes in dental health [MammogramDate] : 10/2018 [PapSmearDate] : 06/2018 [BoneDensityComments] : never done [ColonoscopyDate] : 2016 [ColonoscopyComments] : Dr Carrero [HIVComments] : States was done 5 years ago, negative. 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

## 2019-10-14 LAB
25(OH)D3 SERPL-MCNC: 20.3 NG/ML
ALBUMIN SERPL ELPH-MCNC: 4.6 G/DL
ALP BLD-CCNC: 65 U/L
ALT SERPL-CCNC: 64 U/L
ANION GAP SERPL CALC-SCNC: 18 MMOL/L
AST SERPL-CCNC: 41 U/L
BASOPHILS # BLD AUTO: 0.05 K/UL
BASOPHILS NFR BLD AUTO: 1.1 %
BILIRUB SERPL-MCNC: 0.6 MG/DL
BUN SERPL-MCNC: 20 MG/DL
CALCIUM SERPL-MCNC: 10 MG/DL
CHLORIDE SERPL-SCNC: 102 MMOL/L
CHOLEST SERPL-MCNC: 201 MG/DL
CHOLEST/HDLC SERPL: 3.1 RATIO
CO2 SERPL-SCNC: 23 MMOL/L
CREAT SERPL-MCNC: 0.72 MG/DL
EOSINOPHIL # BLD AUTO: 0.07 K/UL
EOSINOPHIL NFR BLD AUTO: 1.5 %
GLUCOSE SERPL-MCNC: 142 MG/DL
HCT VFR BLD CALC: 42.4 %
HDLC SERPL-MCNC: 66 MG/DL
HGB BLD-MCNC: 13.9 G/DL
IMM GRANULOCYTES NFR BLD AUTO: 0.2 %
LDLC SERPL CALC-MCNC: 122 MG/DL
LYMPHOCYTES # BLD AUTO: 2.3 K/UL
LYMPHOCYTES NFR BLD AUTO: 49.6 %
MAN DIFF?: NORMAL
MCHC RBC-ENTMCNC: 30.8 PG
MCHC RBC-ENTMCNC: 32.8 GM/DL
MCV RBC AUTO: 93.8 FL
MONOCYTES # BLD AUTO: 0.29 K/UL
MONOCYTES NFR BLD AUTO: 6.3 %
NEUTROPHILS # BLD AUTO: 1.92 K/UL
NEUTROPHILS NFR BLD AUTO: 41.3 %
PLATELET # BLD AUTO: 217 K/UL
POTASSIUM SERPL-SCNC: 3.9 MMOL/L
PROT SERPL-MCNC: 7.3 G/DL
RBC # BLD: 4.52 M/UL
RBC # FLD: 14.9 %
SODIUM SERPL-SCNC: 143 MMOL/L
TRIGL SERPL-MCNC: 64 MG/DL
TSH SERPL-ACNC: 2.2 UIU/ML
WBC # FLD AUTO: 4.64 K/UL

## 2019-12-16 ENCOUNTER — APPOINTMENT (OUTPATIENT)
Dept: FAMILY MEDICINE | Facility: CLINIC | Age: 54
End: 2019-12-16
Payer: COMMERCIAL

## 2019-12-16 VITALS
TEMPERATURE: 98.3 F | HEART RATE: 54 BPM | WEIGHT: 169 LBS | BODY MASS INDEX: 36.46 KG/M2 | DIASTOLIC BLOOD PRESSURE: 78 MMHG | HEIGHT: 57 IN | SYSTOLIC BLOOD PRESSURE: 132 MMHG | OXYGEN SATURATION: 99 %

## 2019-12-16 LAB — HBA1C MFR BLD HPLC: 6.1

## 2019-12-16 PROCEDURE — 36415 COLL VENOUS BLD VENIPUNCTURE: CPT

## 2019-12-16 PROCEDURE — 99213 OFFICE O/P EST LOW 20 MIN: CPT | Mod: 25

## 2019-12-16 PROCEDURE — 83036 HEMOGLOBIN GLYCOSYLATED A1C: CPT | Mod: QW

## 2019-12-16 NOTE — COUNSELING
[Weight management counseling provided] : Weight management [Healthy eating counseling provided] : healthy eating [Activity counseling provided] : activity [None] : None [Good understanding] : Patient has a good understanding of lifestyle changes and the steps needed to achieve self management goals [Fall prevention counseling provided] : Fall prevention counseling provided [Behavioral health counseling provided] : Behavioral health counseling provided

## 2019-12-16 NOTE — ASSESSMENT
[FreeTextEntry1] : 54 y/o HF, originally from [facility], poor historian,\par \par Abnormal LFT's:\par -repeat LFT's today\par \par PreDM:\par -HbA1c: 6.1%\par -TLC\par \par -fatigue/ malaise: Fibromyalgia?\par -Pending Rheumatology eval.\par -EKG (10/2018): NSR\par \par Microscopic hematuria;\par -Seen by urology.\par -Cystoscopy done, USG done and advise Gyn evaluation.\par \par Myomatous uterine/ Endometrial thickening:\par -Gyn referral. Pending eval. New referral \par \par HTN;\par -on losartan\par \par GERD; \par -omeprazole\par \par Depression;\par -sertraline\par \par Chronic constipation:\par -On docusate.\par -secondary to Uterine Myoma?\par Gyn; 2018\par \par Colonoscopy;2016\par \par Mammography; done 10/27/18. New referral.\par \par Flu shot : up to date.\par \par Migraines: Start sumatriptan 25mg prn\par \par

## 2019-12-16 NOTE — PHYSICAL EXAM
[No Acute Distress] : no acute distress [Well Nourished] : well nourished [Well Developed] : well developed [Normal Sclera/Conjunctiva] : normal sclera/conjunctiva [Well-Appearing] : well-appearing [PERRL] : pupils equal round and reactive to light [EOMI] : extraocular movements intact [Normal Outer Ear/Nose] : the outer ears and nose were normal in appearance [No JVD] : no jugular venous distention [Normal Oropharynx] : the oropharynx was normal [No Lymphadenopathy] : no lymphadenopathy [Supple] : supple [No Respiratory Distress] : no respiratory distress  [Thyroid Normal, No Nodules] : the thyroid was normal and there were no nodules present [Clear to Auscultation] : lungs were clear to auscultation bilaterally [No Accessory Muscle Use] : no accessory muscle use [Normal Rate] : normal rate  [Regular Rhythm] : with a regular rhythm [No Murmur] : no murmur heard [Normal S1, S2] : normal S1 and S2 [No Abdominal Bruit] : a ~M bruit was not heard ~T in the abdomen [No Carotid Bruits] : no carotid bruits [No Varicosities] : no varicosities [Pedal Pulses Present] : the pedal pulses are present [No Edema] : there was no peripheral edema [No Extremity Clubbing/Cyanosis] : no extremity clubbing/cyanosis [No Palpable Aorta] : no palpable aorta [Soft] : abdomen soft [Non Tender] : non-tender [Non-distended] : non-distended [No Masses] : no abdominal mass palpated [No HSM] : no HSM [Normal Bowel Sounds] : normal bowel sounds [Normal Posterior Cervical Nodes] : no posterior cervical lymphadenopathy [Normal Anterior Cervical Nodes] : no anterior cervical lymphadenopathy [No CVA Tenderness] : no CVA  tenderness [No Spinal Tenderness] : no spinal tenderness [No Joint Swelling] : no joint swelling [Grossly Normal Strength/Tone] : grossly normal strength/tone [No Rash] : no rash [Normal Gait] : normal gait [Coordination Grossly Intact] : coordination grossly intact [No Focal Deficits] : no focal deficits [Deep Tendon Reflexes (DTR)] : deep tendon reflexes were 2+ and symmetric [Normal Affect] : the affect was normal [Normal Insight/Judgement] : insight and judgment were intact

## 2019-12-16 NOTE — REVIEW OF SYSTEMS
[Muscle Weakness] : muscle weakness [Fatigue] : fatigue [Headache] : headache [Dizziness] : dizziness [Confusion] : confusion [Fainting] : no fainting [FreeTextEntry9] : cervicalgia [Unsteady Walking] : no ataxia

## 2019-12-19 LAB
ALBUMIN SERPL ELPH-MCNC: 4.7 G/DL
ALP BLD-CCNC: 57 U/L
ALT SERPL-CCNC: 51 U/L
AST SERPL-CCNC: 59 U/L
BILIRUB DIRECT SERPL-MCNC: 0.2 MG/DL
BILIRUB INDIRECT SERPL-MCNC: 0.7 MG/DL
BILIRUB SERPL-MCNC: 0.8 MG/DL
PROT SERPL-MCNC: 7.6 G/DL

## 2020-02-28 ENCOUNTER — FORM ENCOUNTER (OUTPATIENT)
Age: 55
End: 2020-02-28

## 2020-02-29 ENCOUNTER — OUTPATIENT (OUTPATIENT)
Dept: OUTPATIENT SERVICES | Facility: HOSPITAL | Age: 55
LOS: 1 days | End: 2020-02-29
Payer: COMMERCIAL

## 2020-02-29 ENCOUNTER — APPOINTMENT (OUTPATIENT)
Dept: MAMMOGRAPHY | Facility: CLINIC | Age: 55
End: 2020-02-29
Payer: COMMERCIAL

## 2020-02-29 DIAGNOSIS — Z12.31 ENCOUNTER FOR SCREENING MAMMOGRAM FOR MALIGNANT NEOPLASM OF BREAST: ICD-10-CM

## 2020-02-29 PROCEDURE — 77063 BREAST TOMOSYNTHESIS BI: CPT

## 2020-02-29 PROCEDURE — 77067 SCR MAMMO BI INCL CAD: CPT | Mod: 26

## 2020-02-29 PROCEDURE — 77063 BREAST TOMOSYNTHESIS BI: CPT | Mod: 26

## 2020-02-29 PROCEDURE — 77067 SCR MAMMO BI INCL CAD: CPT

## 2020-06-25 ENCOUNTER — RX RENEWAL (OUTPATIENT)
Age: 55
End: 2020-06-25

## 2020-08-06 ENCOUNTER — RX RENEWAL (OUTPATIENT)
Age: 55
End: 2020-08-06

## 2020-08-14 ENCOUNTER — APPOINTMENT (OUTPATIENT)
Dept: FAMILY MEDICINE | Facility: CLINIC | Age: 55
End: 2020-08-14
Payer: COMMERCIAL

## 2020-08-14 ENCOUNTER — RESULT CHARGE (OUTPATIENT)
Age: 55
End: 2020-08-14

## 2020-08-14 VITALS
SYSTOLIC BLOOD PRESSURE: 120 MMHG | BODY MASS INDEX: 39.05 KG/M2 | WEIGHT: 181 LBS | HEIGHT: 57 IN | RESPIRATION RATE: 12 BRPM | DIASTOLIC BLOOD PRESSURE: 70 MMHG | OXYGEN SATURATION: 99 % | TEMPERATURE: 97.7 F | HEART RATE: 97 BPM

## 2020-08-14 LAB — HBA1C MFR BLD HPLC: 6.1

## 2020-08-14 PROCEDURE — 36415 COLL VENOUS BLD VENIPUNCTURE: CPT

## 2020-08-14 PROCEDURE — 83036 HEMOGLOBIN GLYCOSYLATED A1C: CPT | Mod: QW

## 2020-08-14 PROCEDURE — 99214 OFFICE O/P EST MOD 30 MIN: CPT | Mod: 25

## 2020-08-14 NOTE — REVIEW OF SYSTEMS
[Fatigue] : fatigue [Headache] : headache [Muscle Weakness] : muscle weakness [Dizziness] : dizziness [Confusion] : confusion [Fainting] : no fainting [Unsteady Walking] : no ataxia [FreeTextEntry9] : cervicalgia

## 2020-08-14 NOTE — ASSESSMENT
[FreeTextEntry1] : 54 y/o HF, originally from [facility], poor historian,\par \par Abnormal LFT's:\par -repeat LFT's today\par \par PreDM:\par -HbA1c: 6.1%\par -TLC\par \par -fatigue/ malaise: Fibromyalgia?\par -Pending Rheumatology eval.\par -EKG (10/2018): NSR\par \par Microscopic hematuria;\par -Seen by urology.\par -Cystoscopy done, USG done and advise Gyn evaluation.\par \par Myomatous uterine/ Endometrial thickening:\par -Gyn referral. Pending eval. New referral \par \par HTN;\par -on losartan-HCTZ\par \par GERD; \par -omeprazole\par \par Chronic constipation:\par -On docusate.\par -secondary to Uterine Myoma?\par Gyn; 2018\par \par Colonoscopy;2016\par \par Mammography; done 02/20.\par \par Flu shot : up to date.\par \par Migraines: Start sumatriptan 25mg prn\par \par F/up for annual physical.\par

## 2020-08-14 NOTE — COUNSELING
[Fall prevention counseling provided] : Fall prevention counseling provided [Weight management counseling provided] : Weight management [Healthy eating counseling provided] : healthy eating [Activity counseling provided] : activity [Behavioral health counseling provided] : behavioral health  [None] : None [Good understanding] : Patient has a good understanding of lifestyle changes and the steps needed to achieve self management goals

## 2020-08-14 NOTE — HISTORY OF PRESENT ILLNESS
[FreeTextEntry1] : Glucose check\par LFT's [de-identified] : Pt is a poor hx and difficulty to verbalize.\par Pt was seen by urology for evaluation of microscopic hematuria. Cystoscopy done .\par Pt with HTN on Losartan-HCTZ, GERD on omeprazole prn.\par Pt had USG: Endometrial thickening and Myomatous Uterus. Pt was advise to have gyn eval.and has pending evaluation.

## 2020-08-14 NOTE — PHYSICAL EXAM
[No Acute Distress] : no acute distress [Well Nourished] : well nourished [Well-Appearing] : well-appearing [Well Developed] : well developed [Normal Sclera/Conjunctiva] : normal sclera/conjunctiva [PERRL] : pupils equal round and reactive to light [Normal Outer Ear/Nose] : the outer ears and nose were normal in appearance [EOMI] : extraocular movements intact [Normal Oropharynx] : the oropharynx was normal [No JVD] : no jugular venous distention [Supple] : supple [No Lymphadenopathy] : no lymphadenopathy [Thyroid Normal, No Nodules] : the thyroid was normal and there were no nodules present [No Respiratory Distress] : no respiratory distress  [Clear to Auscultation] : lungs were clear to auscultation bilaterally [No Accessory Muscle Use] : no accessory muscle use [Normal Rate] : normal rate  [Regular Rhythm] : with a regular rhythm [Normal S1, S2] : normal S1 and S2 [No Murmur] : no murmur heard [No Carotid Bruits] : no carotid bruits [No Varicosities] : no varicosities [No Abdominal Bruit] : a ~M bruit was not heard ~T in the abdomen [Pedal Pulses Present] : the pedal pulses are present [No Extremity Clubbing/Cyanosis] : no extremity clubbing/cyanosis [No Edema] : there was no peripheral edema [No Palpable Aorta] : no palpable aorta [Non Tender] : non-tender [Soft] : abdomen soft [No Masses] : no abdominal mass palpated [Non-distended] : non-distended [No HSM] : no HSM [Normal Bowel Sounds] : normal bowel sounds [Normal Posterior Cervical Nodes] : no posterior cervical lymphadenopathy [Normal Anterior Cervical Nodes] : no anterior cervical lymphadenopathy [No Spinal Tenderness] : no spinal tenderness [No CVA Tenderness] : no CVA  tenderness [No Joint Swelling] : no joint swelling [Grossly Normal Strength/Tone] : grossly normal strength/tone [No Rash] : no rash [Coordination Grossly Intact] : coordination grossly intact [Normal Gait] : normal gait [No Focal Deficits] : no focal deficits [Normal Affect] : the affect was normal [Deep Tendon Reflexes (DTR)] : deep tendon reflexes were 2+ and symmetric [Normal Insight/Judgement] : insight and judgment were intact

## 2020-08-17 LAB
ALBUMIN SERPL ELPH-MCNC: 4.7 G/DL
ALP BLD-CCNC: 69 U/L
ALT SERPL-CCNC: 52 U/L
AST SERPL-CCNC: 37 U/L
BILIRUB DIRECT SERPL-MCNC: 0.1 MG/DL
BILIRUB INDIRECT SERPL-MCNC: 0.3 MG/DL
BILIRUB SERPL-MCNC: 0.4 MG/DL
PROT SERPL-MCNC: 7.3 G/DL

## 2020-10-12 ENCOUNTER — APPOINTMENT (OUTPATIENT)
Dept: FAMILY MEDICINE | Facility: CLINIC | Age: 55
End: 2020-10-12
Payer: COMMERCIAL

## 2020-10-12 ENCOUNTER — NON-APPOINTMENT (OUTPATIENT)
Age: 55
End: 2020-10-12

## 2020-10-12 VITALS
HEART RATE: 61 BPM | DIASTOLIC BLOOD PRESSURE: 68 MMHG | WEIGHT: 182 LBS | OXYGEN SATURATION: 98 % | BODY MASS INDEX: 36.69 KG/M2 | SYSTOLIC BLOOD PRESSURE: 110 MMHG | HEIGHT: 59 IN | TEMPERATURE: 97.6 F | RESPIRATION RATE: 14 BRPM

## 2020-10-12 PROCEDURE — G0008: CPT

## 2020-10-12 PROCEDURE — 99396 PREV VISIT EST AGE 40-64: CPT | Mod: 25

## 2020-10-12 PROCEDURE — 90686 IIV4 VACC NO PRSV 0.5 ML IM: CPT

## 2020-10-12 PROCEDURE — 93000 ELECTROCARDIOGRAM COMPLETE: CPT

## 2020-10-12 PROCEDURE — 36415 COLL VENOUS BLD VENIPUNCTURE: CPT

## 2020-10-12 RX ORDER — SUMATRIPTAN 25 MG/1
25 TABLET, FILM COATED ORAL
Qty: 15 | Refills: 2 | Status: DISCONTINUED | COMMUNITY
Start: 2018-10-29 | End: 2020-10-12

## 2020-10-12 RX ORDER — POLYETHYLENE GLYCOL 3350 17 G/17G
17 POWDER, FOR SOLUTION ORAL DAILY
Qty: 1 | Refills: 3 | Status: DISCONTINUED | COMMUNITY
Start: 2018-01-29 | End: 2020-10-12

## 2020-10-12 NOTE — REVIEW OF SYSTEMS
[Palpitations] : palpitations [Back Pain] : back pain [Heartburn] : no heartburn [Suicidal] : not suicidal [Insomnia] : no insomnia [Anxiety] : no anxiety [Depression] : no depression [FreeTextEntry9] : left leg pain

## 2020-10-12 NOTE — ASSESSMENT
[FreeTextEntry1] : 53 y/o HF, from [facility], poor historian and poor comprehension:\par \par CPE: blood and UA today.\par HIV screening: refused\par HC screenin negative\par \par HTN/ Atypical chest pain:\par -on Losartan-HCTZ 50-12.5mg.\par -EKG: Sinus Kulwinder/ Ectopic vent beats.\par -Cardiology referral.\par \par GERD: on omeprazole prn. Diet advise.\par \par Obesity: TLC. Diet counseling and guidance. Counseling was provided in diet and exercise. TLC d/w pt in details, regarding benefits of low calories and portion control. \par \par Microscopic hematuria:\par -seen by urology\par \par Abnormal pelvic USG: thickened endometrium/ uterine Myoma:\par -Gyn eval.with Dr Vee Walton\par \par Mammo: 2020\par Colonoscopy: 2016.\par Immunizations: flu shot today\par \par Left Sciatic pain:\par -Start medrol pack and methocarbamol\par -F/up prn\par

## 2020-10-12 NOTE — HISTORY OF PRESENT ILLNESS
[FreeTextEntry1] : Annual physical. [de-identified] : 55 y/o HF, originally from [facility], presents today for annual physical.\par \par Pt w/ hx HTN on losartan. Pt had Holter 2018, seen by cardiology.: WNL.\par \par Pt is very poor historian.\par PT reports pain in lower back with radiation to left leg for aprox 8 days. denies similar episodes in the past. States pain is 10/10 and reports pain when walking.Humphrey numbness in extremities.\par Reports palpitations and cough.

## 2020-10-12 NOTE — HEALTH RISK ASSESSMENT
[Fair] :  ~his/her~ mood as fair [No falls in past year] : Patient reported no falls in the past year [0] : 2) Feeling down, depressed, or hopeless: Not at all (0) [Patient reported mammogram was normal] : Patient reported mammogram was normal [Patient reported PAP Smear was normal] : Patient reported PAP Smear was normal [Patient reported colonoscopy was normal] : Patient reported colonoscopy was normal [HIV test declined] : HIV test declined [Language] : difficulty with language [Reasoning] : difficulty with reasoning [Access to Medications] : access to medications [With Family] : lives with family [Employed] : employed [] :  [# Of Children ___] : has [unfilled] children [Feels Safe at Home] : Feels safe at home [Fully functional (bathing, dressing, toileting, transferring, walking, feeding)] : Fully functional (bathing, dressing, toileting, transferring, walking, feeding) [Fully functional (using the telephone, shopping, preparing meals, housekeeping, doing laundry, using] : Fully functional and needs no help or supervision to perform IADLs (using the telephone, shopping, preparing meals, housekeeping, doing laundry, using transportation, managing medications and managing finances) [Smoke Detector] : smoke detector [Seat Belt] :  uses seat belt [No] : In the past 12 months have you used drugs other than those required for medical reasons? No [Hepatitis C test declined] : Hepatitis C test declined [With Patient/Caregiver] : With Patient/Caregiver [Designated Healthcare Proxy] : Designated healthcare proxy [Name: ___] : Health Care Proxy's Name: [unfilled]  [Relationship: ___] : Relationship: [unfilled] [] : No [de-identified] : no [de-identified] : no [Change in mental status noted] : No change in mental status noted [Behavior] : denies difficulty with behavior [Handling Complex Tasks] : denies difficulty handling complex tasks [Sexually Active] : not sexually active [Reports changes in hearing] : Reports no changes in hearing [Reports changes in vision] : Reports no changes in vision [Reports changes in dental health] : Reports no changes in dental health [MammogramDate] : 02/20 [PapSmearDate] : 06/2018 [BoneDensityComments] : never done [ColonoscopyDate] : 2016 [ColonoscopyComments] : Dr Carrero [HIVComments] : States was done 5 years ago, negative. 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

## 2020-10-12 NOTE — COUNSELING
[Fall prevention counseling provided] : Fall prevention counseling provided [Behavioral health counseling provided] : Behavioral health counseling provided [Other: ____] : [unfilled] [Needs reinforcement, provided] : Patient needs reinforcement on understanding of lifestyle changes and steps needed to achieve self management goal; reinforcement was provided [None] : None

## 2020-10-13 LAB — 25(OH)D3 SERPL-MCNC: 20.6 NG/ML

## 2020-10-14 LAB
ALBUMIN SERPL ELPH-MCNC: 4.8 G/DL
ALP BLD-CCNC: 63 U/L
ALT SERPL-CCNC: 49 U/L
ANION GAP SERPL CALC-SCNC: 12 MMOL/L
APPEARANCE: CLEAR
AST SERPL-CCNC: 31 U/L
BASOPHILS # BLD AUTO: 0.07 K/UL
BASOPHILS NFR BLD AUTO: 1.6 %
BILIRUB SERPL-MCNC: 0.8 MG/DL
BILIRUBIN URINE: NEGATIVE
BLOOD URINE: NORMAL
BUN SERPL-MCNC: 19 MG/DL
CALCIUM SERPL-MCNC: 9.5 MG/DL
CHLORIDE SERPL-SCNC: 101 MMOL/L
CHOLEST SERPL-MCNC: 202 MG/DL
CHOLEST/HDLC SERPL: 3.1 RATIO
CO2 SERPL-SCNC: 27 MMOL/L
COLOR: YELLOW
CREAT SERPL-MCNC: 0.7 MG/DL
EOSINOPHIL # BLD AUTO: 0.1 K/UL
EOSINOPHIL NFR BLD AUTO: 2.3 %
ESTIMATED AVERAGE GLUCOSE: 140 MG/DL
GLUCOSE QUALITATIVE U: NEGATIVE
GLUCOSE SERPL-MCNC: 112 MG/DL
HBA1C MFR BLD HPLC: 6.5 %
HCT VFR BLD CALC: 42.6 %
HDLC SERPL-MCNC: 65 MG/DL
HGB BLD-MCNC: 13.7 G/DL
IMM GRANULOCYTES NFR BLD AUTO: 0.2 %
KETONES URINE: NEGATIVE
LDLC SERPL CALC-MCNC: 126 MG/DL
LEUKOCYTE ESTERASE URINE: NEGATIVE
LYMPHOCYTES # BLD AUTO: 1.86 K/UL
LYMPHOCYTES NFR BLD AUTO: 43.7 %
MAN DIFF?: NORMAL
MCHC RBC-ENTMCNC: 30.4 PG
MCHC RBC-ENTMCNC: 32.2 GM/DL
MCV RBC AUTO: 94.5 FL
MONOCYTES # BLD AUTO: 0.29 K/UL
MONOCYTES NFR BLD AUTO: 6.8 %
NEUTROPHILS # BLD AUTO: 1.93 K/UL
NEUTROPHILS NFR BLD AUTO: 45.4 %
NITRITE URINE: NEGATIVE
PH URINE: 6.5
PLATELET # BLD AUTO: 211 K/UL
POTASSIUM SERPL-SCNC: 4.1 MMOL/L
PROT SERPL-MCNC: 7.4 G/DL
PROTEIN URINE: NEGATIVE
RBC # BLD: 4.51 M/UL
RBC # FLD: 14.7 %
SODIUM SERPL-SCNC: 140 MMOL/L
SPECIFIC GRAVITY URINE: 1.02
TRIGL SERPL-MCNC: 58 MG/DL
TSH SERPL-ACNC: 1.7 UIU/ML
UROBILINOGEN URINE: NORMAL
WBC # FLD AUTO: 4.26 K/UL

## 2020-11-11 RX ORDER — LOSARTAN POTASSIUM AND HYDROCHLOROTHIAZIDE 12.5; 5 MG/1; MG/1
50-12.5 TABLET ORAL DAILY
Qty: 30 | Refills: 3 | Status: COMPLETED | COMMUNITY
Start: 2018-06-01 | End: 2020-11-11

## 2020-11-17 RX ORDER — LOSARTAN POTASSIUM 50 MG/1
50 TABLET, FILM COATED ORAL
Qty: 90 | Refills: 1 | Status: COMPLETED | COMMUNITY
Start: 2020-11-11 | End: 2020-11-17

## 2020-11-17 RX ORDER — HYDROCHLOROTHIAZIDE 12.5 MG/1
12.5 TABLET ORAL DAILY
Qty: 90 | Refills: 1 | Status: COMPLETED | COMMUNITY
Start: 2020-11-11 | End: 2020-11-17

## 2020-12-16 PROBLEM — Z86.19 HISTORY OF CANDIDIASIS OF VAGINA: Status: RESOLVED | Noted: 2018-06-27 | Resolved: 2020-12-16

## 2020-12-17 ENCOUNTER — APPOINTMENT (OUTPATIENT)
Dept: FAMILY MEDICINE | Facility: CLINIC | Age: 55
End: 2020-12-17
Payer: COMMERCIAL

## 2020-12-17 VITALS
TEMPERATURE: 97.8 F | HEIGHT: 59 IN | SYSTOLIC BLOOD PRESSURE: 118 MMHG | OXYGEN SATURATION: 99 % | RESPIRATION RATE: 14 BRPM | WEIGHT: 185 LBS | BODY MASS INDEX: 37.29 KG/M2 | DIASTOLIC BLOOD PRESSURE: 70 MMHG | HEART RATE: 75 BPM

## 2020-12-17 PROCEDURE — 81003 URINALYSIS AUTO W/O SCOPE: CPT | Mod: QW

## 2020-12-17 PROCEDURE — 99214 OFFICE O/P EST MOD 30 MIN: CPT | Mod: 25

## 2020-12-17 PROCEDURE — 99072 ADDL SUPL MATRL&STAF TM PHE: CPT

## 2020-12-17 RX ORDER — TRIAMCINOLONE ACETONIDE 40 MG/ML
40 SUSPENSION INTRA-ARTERIAL; INTRAMUSCULAR
Qty: 1 | Refills: 0 | Status: COMPLETED | OUTPATIENT
Start: 2020-12-17

## 2020-12-17 RX ORDER — METHOCARBAMOL 500 MG/1
500 TABLET, FILM COATED ORAL
Qty: 30 | Refills: 3 | Status: DISCONTINUED | COMMUNITY
Start: 2020-10-12 | End: 2020-12-17

## 2020-12-17 RX ORDER — KETOROLAC TROMETHAMINE 15 MG/ML
15 INJECTION, SOLUTION INTRAMUSCULAR; INTRAVENOUS
Refills: 0 | Status: COMPLETED | OUTPATIENT
Start: 2020-12-17

## 2020-12-17 RX ORDER — METHYLPREDNISOLONE 4 MG/1
4 TABLET ORAL
Qty: 1 | Refills: 0 | Status: DISCONTINUED | COMMUNITY
Start: 2020-10-12 | End: 2020-12-17

## 2020-12-17 RX ADMIN — TRIAMCINOLONE ACETONIDE 1 MG/ML: 40 INJECTION, SUSPENSION INTRA-ARTICULAR; INTRAMUSCULAR at 00:00

## 2020-12-17 RX ADMIN — KETOROLAC TROMETHAMINE 0 MG/ML: 15 INJECTION, SOLUTION INTRAMUSCULAR; INTRAVENOUS at 00:00

## 2020-12-17 NOTE — HISTORY OF PRESENT ILLNESS
[FreeTextEntry8] : Kristie presents today for evaluation of persistent left side low back pain with radiation to left leg and numbness. she was seen in the past with similar symptoms. treated with medrol pack. States symptoms improved during treatment and has recurrence. states sometimes pain is 10/10. Not relieving or aggravating factors.\par Pt also reports urinary symptoms, similar symptoms in the past with UTI. denies fever.\par She reports also neck pain and spasm.\rody Pt is a very poor historian

## 2020-12-17 NOTE — ASSESSMENT
[FreeTextEntry1] : Pt presents today for evaluation of recurrent UTI symptoms and low back pain with left leg pain similar to episode in the past:\par \par Left side Sciatic pain/ cervicalgia\par -Kenalog+ketorolac IM\par -Start Cyclobenzaprine\par -PT referral\par -Xray order\par -F/up in 3 weeks\par \par recurrent UTI:\par -UA dipstick: + leuk and blood\par -urine culture\par -Start macrobid\par \par Further recommendations with test results.\par \par

## 2020-12-17 NOTE — PHYSICAL EXAM
[Normal] : normal rate, regular rhythm, normal S1 and S2 and no murmur heard [Soft] : abdomen soft [No HSM] : no HSM [Suprapubic] : in the suprapubic area [de-identified] : + left leg raise test

## 2020-12-29 ENCOUNTER — RESULT CHARGE (OUTPATIENT)
Age: 55
End: 2020-12-29

## 2021-01-23 ENCOUNTER — RX RENEWAL (OUTPATIENT)
Age: 56
End: 2021-01-23

## 2021-02-08 ENCOUNTER — OUTPATIENT (OUTPATIENT)
Dept: OUTPATIENT SERVICES | Facility: HOSPITAL | Age: 56
LOS: 1 days | End: 2021-02-08
Payer: COMMERCIAL

## 2021-02-08 ENCOUNTER — APPOINTMENT (OUTPATIENT)
Dept: RADIOLOGY | Facility: CLINIC | Age: 56
End: 2021-02-08
Payer: COMMERCIAL

## 2021-02-08 ENCOUNTER — APPOINTMENT (OUTPATIENT)
Dept: FAMILY MEDICINE | Facility: CLINIC | Age: 56
End: 2021-02-08
Payer: COMMERCIAL

## 2021-02-08 VITALS
RESPIRATION RATE: 20 BRPM | SYSTOLIC BLOOD PRESSURE: 120 MMHG | HEART RATE: 69 BPM | BODY MASS INDEX: 37.29 KG/M2 | WEIGHT: 185 LBS | DIASTOLIC BLOOD PRESSURE: 80 MMHG | TEMPERATURE: 98.9 F | OXYGEN SATURATION: 98 % | HEIGHT: 59 IN

## 2021-02-08 DIAGNOSIS — K21.9 GASTRO-ESOPHAGEAL REFLUX DISEASE W/OUT ESOPHAGITIS: ICD-10-CM

## 2021-02-08 DIAGNOSIS — M54.42 LUMBAGO WITH SCIATICA, LEFT SIDE: ICD-10-CM

## 2021-02-08 PROCEDURE — 72110 X-RAY EXAM L-2 SPINE 4/>VWS: CPT

## 2021-02-08 PROCEDURE — 72110 X-RAY EXAM L-2 SPINE 4/>VWS: CPT | Mod: 26

## 2021-02-08 PROCEDURE — 99072 ADDL SUPL MATRL&STAF TM PHE: CPT

## 2021-02-08 PROCEDURE — 83036 HEMOGLOBIN GLYCOSYLATED A1C: CPT | Mod: QW

## 2021-02-08 PROCEDURE — 99214 OFFICE O/P EST MOD 30 MIN: CPT | Mod: 25

## 2021-02-08 RX ORDER — NITROFURANTOIN (MONOHYDRATE/MACROCRYSTALS) 25; 75 MG/1; MG/1
100 CAPSULE ORAL
Qty: 14 | Refills: 0 | Status: DISCONTINUED | COMMUNITY
Start: 2020-12-17 | End: 2021-02-08

## 2021-02-08 RX ORDER — CYCLOBENZAPRINE HYDROCHLORIDE 5 MG/1
5 TABLET, FILM COATED ORAL
Qty: 30 | Refills: 0 | Status: DISCONTINUED | COMMUNITY
Start: 2020-12-17 | End: 2021-02-08

## 2021-02-08 NOTE — ASSESSMENT
[FreeTextEntry1] : Pt presents today for evaluation of recurrent UTI symptoms and low back pain with left leg pain similar to episode in the past:\par \par Left side Sciatic pain/ cervicalgia\par -Kenalog+ketorolac IM> given in 12/2020\par -methocarbamol.\par -PT referral\par -Xray order> pending.\par -F/up in 3 weeks\par \par Pre DM;\par -HbA1c:6.4%\par \par HTN: welll control.\par -Continue LOsartan-HCTZ\par \par recurrent UTI:\par -Resolved.\par \par Colonoscopy/ EGD: 2016 with Dr Carrero\par -re[peat colonoscopy in 2026\par -EGD: Gastritis. on Omeprazole.\par \par Mammo: referral\par Gyn: f/up with Dr shields\par \par

## 2021-02-08 NOTE — COUNSELING
[Fall prevention counseling provided] : Fall prevention counseling provided [Behavioral health counseling provided] : Behavioral health counseling provided [None] : None [Good understanding] : Patient has a good understanding of lifestyle changes and steps needed to achieve self management goal No

## 2021-02-08 NOTE — PHYSICAL EXAM
[Normal] : normal rate, regular rhythm, normal S1 and S2 and no murmur heard [Soft] : abdomen soft [No HSM] : no HSM [Suprapubic] : in the suprapubic area [de-identified] : + left leg raise test

## 2021-02-08 NOTE — HISTORY OF PRESENT ILLNESS
[FreeTextEntry1] : F/up in back pain [de-identified] : Kristie presents today for f/up in persistent left side low back pain with radiation to left leg and numbness. \par Kenalog shot was given in last visit, and states symptoms are better. She takes regular Diclofenac.\par She was advise to have xray and PT and none was done. She states she started to work out and since symptoms got worse.\par States symptoms at p[resent time are mild, but sometimes wakes up with severe pain.\par Pt reports headaches in a daily basis.\par Chronic constipation and GERD symptoms.\par Pt is a poor historian.

## 2021-04-10 ENCOUNTER — RESULT REVIEW (OUTPATIENT)
Age: 56
End: 2021-04-10

## 2021-04-10 ENCOUNTER — OUTPATIENT (OUTPATIENT)
Dept: OUTPATIENT SERVICES | Facility: HOSPITAL | Age: 56
LOS: 1 days | End: 2021-04-10
Payer: COMMERCIAL

## 2021-04-10 ENCOUNTER — APPOINTMENT (OUTPATIENT)
Dept: MAMMOGRAPHY | Facility: CLINIC | Age: 56
End: 2021-04-10
Payer: COMMERCIAL

## 2021-04-10 DIAGNOSIS — Z00.8 ENCOUNTER FOR OTHER GENERAL EXAMINATION: ICD-10-CM

## 2021-04-10 DIAGNOSIS — Z12.31 ENCOUNTER FOR SCREENING MAMMOGRAM FOR MALIGNANT NEOPLASM OF BREAST: ICD-10-CM

## 2021-04-10 PROCEDURE — 77063 BREAST TOMOSYNTHESIS BI: CPT

## 2021-04-10 PROCEDURE — 77067 SCR MAMMO BI INCL CAD: CPT

## 2021-04-10 PROCEDURE — 77067 SCR MAMMO BI INCL CAD: CPT | Mod: 26

## 2021-04-10 PROCEDURE — 77063 BREAST TOMOSYNTHESIS BI: CPT | Mod: 26

## 2021-06-07 ENCOUNTER — APPOINTMENT (OUTPATIENT)
Dept: FAMILY MEDICINE | Facility: CLINIC | Age: 56
End: 2021-06-07
Payer: COMMERCIAL

## 2021-06-07 VITALS
SYSTOLIC BLOOD PRESSURE: 124 MMHG | DIASTOLIC BLOOD PRESSURE: 80 MMHG | WEIGHT: 182 LBS | BODY MASS INDEX: 36.69 KG/M2 | TEMPERATURE: 97.8 F | HEART RATE: 67 BPM | OXYGEN SATURATION: 98 % | RESPIRATION RATE: 16 BRPM | HEIGHT: 59 IN

## 2021-06-07 DIAGNOSIS — K59.00 CONSTIPATION, UNSPECIFIED: ICD-10-CM

## 2021-06-07 LAB — HBA1C MFR BLD HPLC: 6.6

## 2021-06-07 PROCEDURE — 99072 ADDL SUPL MATRL&STAF TM PHE: CPT

## 2021-06-07 PROCEDURE — 83036 HEMOGLOBIN GLYCOSYLATED A1C: CPT | Mod: QW

## 2021-06-07 PROCEDURE — 99214 OFFICE O/P EST MOD 30 MIN: CPT | Mod: 25

## 2021-06-07 RX ORDER — METHOCARBAMOL 500 MG/1
500 TABLET, FILM COATED ORAL
Qty: 30 | Refills: 3 | Status: DISCONTINUED | COMMUNITY
Start: 2021-02-08 | End: 2021-06-07

## 2021-06-07 NOTE — HISTORY OF PRESENT ILLNESS
[FreeTextEntry1] : F/up\par meds refills [de-identified] : 54 y/o HF with PreDM, HTN on Losartan-HCTZ, GERD on omeprazole, recurrent UTI's, chronic constipation, left side sciatic pain.\par Pt is a poor historian.\par She is seen by gyn, dr Walton.\par She was seen by urology in the past for evaluation of recurrent UTI's and Microscopic hematuria. She had transvaginal USG which showed endometrial thickening and Uterina mYoma. She did not f/up with gyn.

## 2021-06-07 NOTE — ASSESSMENT
[FreeTextEntry1] : Pt presents today for evaluation of recurrent UTI symptoms and low back pain with left leg pain similar to episode in the past:\par \par Left side Sciatic pain/ cervicalgia\par -Kenalog+ketorolac IM> given in 12/2020\par -PT referral> on hold\par -Xray 02/08/21> Narrow L4-5\par \par Pre DM;\par -HbA1c:6.6%\par -TLC advise\par -F/up in 3 months\par \par HTN: welll control.\par -Continue LOsartan-HCTZ\par \par recurrent UTI:\par -Resolved.\par \par Colonoscopy/ EGD: 2016 with Dr Carrero\par -repeat colonoscopy in 2026\par -EGD: Gastritis. on Omeprazole.\par \par Mammo: 04/21 BIR[facility] 2\par Gyn: Myomatous uterus/ Thickened endometrium.\par  f/up with Dr shields\par \par Chronic Constipation:\par -Miralax.\par

## 2021-06-07 NOTE — PHYSICAL EXAM
[Normal] : normal rate, regular rhythm, normal S1 and S2 and no murmur heard [Soft] : abdomen soft [No HSM] : no HSM [Suprapubic] : in the suprapubic area [de-identified] : + left leg raise test

## 2021-07-29 ENCOUNTER — APPOINTMENT (OUTPATIENT)
Dept: FAMILY MEDICINE | Facility: CLINIC | Age: 56
End: 2021-07-29
Payer: COMMERCIAL

## 2021-07-29 VITALS
BODY MASS INDEX: 35.08 KG/M2 | DIASTOLIC BLOOD PRESSURE: 78 MMHG | HEART RATE: 79 BPM | SYSTOLIC BLOOD PRESSURE: 130 MMHG | OXYGEN SATURATION: 98 % | WEIGHT: 174 LBS | HEIGHT: 59 IN | TEMPERATURE: 97.9 F | RESPIRATION RATE: 16 BRPM

## 2021-07-29 DIAGNOSIS — M25.551 PAIN IN RIGHT HIP: ICD-10-CM

## 2021-07-29 PROCEDURE — 99212 OFFICE O/P EST SF 10 MIN: CPT

## 2021-07-31 ENCOUNTER — APPOINTMENT (OUTPATIENT)
Dept: RADIOLOGY | Facility: CLINIC | Age: 56
End: 2021-07-31
Payer: COMMERCIAL

## 2021-07-31 ENCOUNTER — OUTPATIENT (OUTPATIENT)
Dept: OUTPATIENT SERVICES | Facility: HOSPITAL | Age: 56
LOS: 1 days | End: 2021-07-31
Payer: SELF-PAY

## 2021-07-31 DIAGNOSIS — Z00.8 ENCOUNTER FOR OTHER GENERAL EXAMINATION: ICD-10-CM

## 2021-07-31 PROCEDURE — 73502 X-RAY EXAM HIP UNI 2-3 VIEWS: CPT | Mod: 26,RT

## 2021-07-31 PROCEDURE — 73502 X-RAY EXAM HIP UNI 2-3 VIEWS: CPT

## 2021-07-31 PROCEDURE — 77080 DXA BONE DENSITY AXIAL: CPT | Mod: 26

## 2021-07-31 PROCEDURE — 77080 DXA BONE DENSITY AXIAL: CPT

## 2021-08-04 NOTE — ASSESSMENT
[FreeTextEntry1] : Pt presents for acute visit c/o RIGHT hip pain.\par -RIGHT Hip and Pelvis Xray ordered\par -Medrol dose pack, Naproxen 500 mg bid prn.\par \par Bone density testing\par \par RTO if pain persists.\par Follow up with Dr Gomez prn

## 2021-08-04 NOTE — HISTORY OF PRESENT ILLNESS
[FreeTextEntry8] : 1 week h/o RIGHT Hip pain which started suddenly while standing up at work, pain radiating down the front of the RIGHT LE, pt has been having trouble going up the stairs, has been taking OTC med with no real relief.

## 2021-08-04 NOTE — PHYSICAL EXAM
[Normal] : normal rate, regular rhythm, normal S1 and S2 and no murmur heard [de-identified] : Decreased ROM of RIGHT LE secondary to pain at the RIGHT hip, no signs of trauma, somoe pain to palpation over RIGHT greater trochanter.

## 2021-09-13 ENCOUNTER — APPOINTMENT (OUTPATIENT)
Dept: FAMILY MEDICINE | Facility: CLINIC | Age: 56
End: 2021-09-13
Payer: COMMERCIAL

## 2021-09-13 VITALS
HEIGHT: 59 IN | SYSTOLIC BLOOD PRESSURE: 120 MMHG | HEART RATE: 71 BPM | OXYGEN SATURATION: 98 % | DIASTOLIC BLOOD PRESSURE: 78 MMHG | TEMPERATURE: 98.3 F | RESPIRATION RATE: 16 BRPM | WEIGHT: 176 LBS | BODY MASS INDEX: 35.48 KG/M2

## 2021-09-13 DIAGNOSIS — R73.03 PREDIABETES.: ICD-10-CM

## 2021-09-13 DIAGNOSIS — M65.251 CALCIFIC TENDINITIS, RIGHT THIGH: ICD-10-CM

## 2021-09-13 DIAGNOSIS — R94.5 ABNORMAL RESULTS OF LIVER FUNCTION STUDIES: ICD-10-CM

## 2021-09-13 LAB — HBA1C MFR BLD HPLC: 6.5

## 2021-09-13 PROCEDURE — 83036 HEMOGLOBIN GLYCOSYLATED A1C: CPT | Mod: QW

## 2021-09-13 PROCEDURE — 36415 COLL VENOUS BLD VENIPUNCTURE: CPT

## 2021-09-13 PROCEDURE — 99214 OFFICE O/P EST MOD 30 MIN: CPT | Mod: 25

## 2021-09-13 NOTE — ASSESSMENT
[FreeTextEntry1] : Pt presents today for f/up in PreDM, HTN, seen 1 month ago with RT hip pain, had xray done 07/31/21:\par \par RT hip calcific tendinosis:\par -PT referral\par -Orthopedic referral.\par \par New onset DM;\par -Start metformin.\par -HbA1c: 6.5%\par \par Left side Sciatic pain/ cervicalgia\par -Kenalog+ketorolac IM> given in 12/2020\par -PT referral> on hold\par -Xray 02/08/21> Narrow L4-5\par \par HTN: welll control.\par -Continue LOsartan-HCTZ\par \par recurrent UTI:\par -Resolved.\par \par Colonoscopy/ EGD: 2016 with Dr Carrero\par -repeat colonoscopy in 2026\par -EGD: Gastritis. on Omeprazole.\par \par Mammo: 04/21 BIRAD 2\par Gyn: Myomatous uterus/ Thickened endometrium.\par  f/up with Dr shields\par \par Chronic Constipation:\par -Miralax.\par

## 2021-09-13 NOTE — PHYSICAL EXAM
[Normal] : normal rate, regular rhythm, normal S1 and S2 and no murmur heard [Soft] : abdomen soft [No HSM] : no HSM [Suprapubic] : in the suprapubic area [de-identified] : + left leg raise test

## 2021-09-13 NOTE — HISTORY OF PRESENT ILLNESS
[FreeTextEntry1] : F/up\par meds refills [de-identified] : 56 y/o HF with PreDM, HTN on Losartan-HCTZ, GERD on omeprazole, recurrent UTI's, chronic constipation, left side sciatic pain.\par Pt is a poor historian.Seen 1 momth ago with Hip pain, had xray done.\par She is seen by gyn, dr Walton.\par She was seen by urology in the past for evaluation of recurrent UTI's and Microscopic hematuria. She had transvaginal USG which showed endometrial thickening and Uterina mYoma. She did not f/up with gyn.

## 2021-09-17 LAB
ANION GAP SERPL CALC-SCNC: 12 MMOL/L
BUN SERPL-MCNC: 22 MG/DL
CALCIUM SERPL-MCNC: 9.7 MG/DL
CHLORIDE SERPL-SCNC: 102 MMOL/L
CO2 SERPL-SCNC: 28 MMOL/L
CREAT SERPL-MCNC: 0.71 MG/DL
GLUCOSE SERPL-MCNC: 99 MG/DL
POTASSIUM SERPL-SCNC: 4 MMOL/L
SODIUM SERPL-SCNC: 142 MMOL/L

## 2021-12-13 ENCOUNTER — APPOINTMENT (OUTPATIENT)
Dept: FAMILY MEDICINE | Facility: CLINIC | Age: 56
End: 2021-12-13
Payer: COMMERCIAL

## 2021-12-13 VITALS
RESPIRATION RATE: 16 BRPM | HEIGHT: 59 IN | BODY MASS INDEX: 34.68 KG/M2 | OXYGEN SATURATION: 98 % | DIASTOLIC BLOOD PRESSURE: 70 MMHG | TEMPERATURE: 97.6 F | WEIGHT: 172 LBS | SYSTOLIC BLOOD PRESSURE: 118 MMHG | HEART RATE: 65 BPM

## 2021-12-13 DIAGNOSIS — Z00.00 ENCOUNTER FOR GENERAL ADULT MEDICAL EXAMINATION W/OUT ABNORMAL FINDINGS: ICD-10-CM

## 2021-12-13 PROCEDURE — 90686 IIV4 VACC NO PRSV 0.5 ML IM: CPT

## 2021-12-13 PROCEDURE — 99396 PREV VISIT EST AGE 40-64: CPT | Mod: 25

## 2021-12-13 PROCEDURE — G0008: CPT

## 2021-12-13 PROCEDURE — 99386 PREV VISIT NEW AGE 40-64: CPT | Mod: 25

## 2021-12-13 PROCEDURE — 36415 COLL VENOUS BLD VENIPUNCTURE: CPT

## 2021-12-13 NOTE — HEALTH RISK ASSESSMENT
[Fair] :  ~his/her~ mood as fair [No] : In the past 12 months have you used drugs other than those required for medical reasons? No [No falls in past year] : Patient reported no falls in the past year [0] : 2) Feeling down, depressed, or hopeless: Not at all (0) [Patient reported mammogram was normal] : Patient reported mammogram was normal [Patient reported PAP Smear was normal] : Patient reported PAP Smear was normal [Patient reported bone density results were normal] : Patient reported bone density results were normal [Patient reported colonoscopy was normal] : Patient reported colonoscopy was normal [HIV test declined] : HIV test declined [Hepatitis C test declined] : Hepatitis C test declined [Language] : difficulty with language [Reasoning] : difficulty with reasoning [Access to Medications] : access to medications [With Family] : lives with family [Employed] : employed [] :  [# Of Children ___] : has [unfilled] children [Feels Safe at Home] : Feels safe at home [Fully functional (bathing, dressing, toileting, transferring, walking, feeding)] : Fully functional (bathing, dressing, toileting, transferring, walking, feeding) [Fully functional (using the telephone, shopping, preparing meals, housekeeping, doing laundry, using] : Fully functional and needs no help or supervision to perform IADLs (using the telephone, shopping, preparing meals, housekeeping, doing laundry, using transportation, managing medications and managing finances) [Smoke Detector] : smoke detector [Seat Belt] :  uses seat belt [With Patient/Caregiver] : , with patient/caregiver [Designated Healthcare Proxy] : Designated healthcare proxy [Name: ___] : Health Care Proxy's Name: [unfilled]  [Relationship: ___] : Relationship: [unfilled] [de-identified] : no [de-identified] : no [Change in mental status noted] : No change in mental status noted [Behavior] : denies difficulty with behavior [Handling Complex Tasks] : denies difficulty handling complex tasks [Sexually Active] : not sexually active [Reports changes in hearing] : Reports no changes in hearing [Reports changes in vision] : Reports no changes in vision [Reports changes in dental health] : Reports no changes in dental health [MammogramDate] : 04/21 [PapSmearDate] : 06/2018 [BoneDensityDate] : 07/21 [BoneDensityComments] : never done [ColonoscopyDate] : 2016 [ColonoscopyComments] : Dr Carrero [HIVComments] : States was done 5 years ago, negative. 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

## 2021-12-13 NOTE — COUNSELING
[Fall prevention counseling provided] : Fall prevention counseling provided [Behavioral health counseling provided] : Behavioral health counseling provided [Other: ____] : [unfilled] [None] : None [Needs reinforcement, provided] : Patient needs reinforcement on understanding of lifestyle changes and steps needed to achieve self management goal; reinforcement was provided

## 2021-12-13 NOTE — ASSESSMENT
[FreeTextEntry1] : 56 y/o HF, from [facility], poor historian and poor comprehension:\par \par CPE: blood and UA today.\par HIV screening: refused\par HC screenin negative\par \par HTN/ Atypical chest pain:\par -on Losartan-HCTZ 50-12.5mg.\par -EKG: Sinus Kulwinder/ Ectopic vent beats.\par -Cardiology referral.\par \par GERD: on omeprazole prn. Diet advise.\par \par Obesity: TLC. Diet counseling and guidance. Counseling was provided in diet and exercise. TLC d/w pt in details, regarding benefits of low calories and portion control. \par \par Microscopic hematuria:\par -seen by urology\par \par Abnormal pelvic USG: thickened endometrium/ uterine Myoma:\par -Gyn eval.with Dr Vee Walton\par \par Mammo: \par Colonoscopy: 2016.\par Immunizations: flu shot today\par \par Left Sciatic pain:\par Stable\par \par Immunization:\par -Had 3 doses COVID\par -Flu shot today\par

## 2021-12-13 NOTE — HISTORY OF PRESENT ILLNESS
[FreeTextEntry1] : Annual physical. [de-identified] : 56 y/o HF, originally from [facility], presents today for annual physical.\par Pt with  PreDM, HTN on Losartan-HCTZ, GERD on omeprazole, recurrent UTI's, chronic constipation, left side sciatic pain.\par Pt is a poor historian.\par She is seen by gyn, dr Walton.\par She was seen by urology in the past for evaluation of recurrent UTI's and Microscopic hematuria. She had transvaginal USG which showed endometrial thickening and Uterina mYoma. She did not f/up with gyn. \par \par

## 2021-12-14 LAB
25(OH)D3 SERPL-MCNC: 21 NG/ML
ALBUMIN SERPL ELPH-MCNC: 4.5 G/DL
ALP BLD-CCNC: 69 U/L
ALT SERPL-CCNC: 47 U/L
ANION GAP SERPL CALC-SCNC: 11 MMOL/L
APPEARANCE: CLEAR
AST SERPL-CCNC: 31 U/L
BASOPHILS # BLD AUTO: 0.05 K/UL
BASOPHILS NFR BLD AUTO: 1.2 %
BILIRUB SERPL-MCNC: 0.5 MG/DL
BILIRUBIN URINE: NEGATIVE
BLOOD URINE: NORMAL
BUN SERPL-MCNC: 21 MG/DL
CALCIUM SERPL-MCNC: 9.5 MG/DL
CHLORIDE SERPL-SCNC: 102 MMOL/L
CHOLEST SERPL-MCNC: 187 MG/DL
CO2 SERPL-SCNC: 27 MMOL/L
COLOR: YELLOW
CREAT SERPL-MCNC: 0.73 MG/DL
EOSINOPHIL # BLD AUTO: 0.05 K/UL
EOSINOPHIL NFR BLD AUTO: 1.2 %
ESTIMATED AVERAGE GLUCOSE: 137 MG/DL
GLUCOSE QUALITATIVE U: NEGATIVE
GLUCOSE SERPL-MCNC: 91 MG/DL
HBA1C MFR BLD HPLC: 6.4 %
HCT VFR BLD CALC: 40.2 %
HDLC SERPL-MCNC: 63 MG/DL
HGB BLD-MCNC: 13.3 G/DL
IMM GRANULOCYTES NFR BLD AUTO: 0 %
KETONES URINE: NEGATIVE
LDLC SERPL CALC-MCNC: 114 MG/DL
LEUKOCYTE ESTERASE URINE: NEGATIVE
LYMPHOCYTES # BLD AUTO: 2.1 K/UL
LYMPHOCYTES NFR BLD AUTO: 48.8 %
MAN DIFF?: NORMAL
MCHC RBC-ENTMCNC: 30.8 PG
MCHC RBC-ENTMCNC: 33.1 GM/DL
MCV RBC AUTO: 93.1 FL
MONOCYTES # BLD AUTO: 0.33 K/UL
MONOCYTES NFR BLD AUTO: 7.7 %
NEUTROPHILS # BLD AUTO: 1.77 K/UL
NEUTROPHILS NFR BLD AUTO: 41.1 %
NITRITE URINE: NEGATIVE
NONHDLC SERPL-MCNC: 123 MG/DL
PH URINE: 6
PLATELET # BLD AUTO: 213 K/UL
POTASSIUM SERPL-SCNC: 4.1 MMOL/L
PROT SERPL-MCNC: 7 G/DL
PROTEIN URINE: NEGATIVE
RBC # BLD: 4.32 M/UL
RBC # FLD: 13.9 %
SODIUM SERPL-SCNC: 140 MMOL/L
SPECIFIC GRAVITY URINE: 1.02
TRIGL SERPL-MCNC: 46 MG/DL
TSH SERPL-ACNC: 1.68 UIU/ML
UROBILINOGEN URINE: NORMAL
WBC # FLD AUTO: 4.3 K/UL

## 2022-02-18 NOTE — DATA REVIEWED
[No studies available for review at this time.] : No studies available for review at this time. no alert and awake

## 2022-03-14 ENCOUNTER — APPOINTMENT (OUTPATIENT)
Dept: FAMILY MEDICINE | Facility: CLINIC | Age: 57
End: 2022-03-14
Payer: COMMERCIAL

## 2022-03-14 ENCOUNTER — NON-APPOINTMENT (OUTPATIENT)
Age: 57
End: 2022-03-14

## 2022-03-14 VITALS
HEIGHT: 59 IN | SYSTOLIC BLOOD PRESSURE: 122 MMHG | RESPIRATION RATE: 16 BRPM | DIASTOLIC BLOOD PRESSURE: 76 MMHG | OXYGEN SATURATION: 98 % | HEART RATE: 81 BPM | TEMPERATURE: 98.2 F | WEIGHT: 161 LBS | BODY MASS INDEX: 32.46 KG/M2

## 2022-03-14 DIAGNOSIS — D25.9 LEIOMYOMA OF UTERUS, UNSPECIFIED: ICD-10-CM

## 2022-03-14 DIAGNOSIS — R00.2 PALPITATIONS: ICD-10-CM

## 2022-03-14 PROCEDURE — 83036 HEMOGLOBIN GLYCOSYLATED A1C: CPT | Mod: QW

## 2022-03-14 PROCEDURE — 99214 OFFICE O/P EST MOD 30 MIN: CPT | Mod: 25

## 2022-03-14 RX ORDER — ACETAMINOPHEN 500 MG
TABLET ORAL
Qty: 1 | Refills: 0 | Status: ACTIVE | COMMUNITY
Start: 2022-03-14 | End: 1900-01-01

## 2022-03-14 RX ORDER — METHYLPREDNISOLONE 4 MG/1
4 TABLET ORAL
Qty: 1 | Refills: 0 | Status: DISCONTINUED | COMMUNITY
Start: 2021-07-29 | End: 2022-03-14

## 2022-03-14 NOTE — CURRENT MEDS
Patient: Dixie Kat Date: 10/26/2021   : 1946    75 year old female      OUTPATIENT WOUND CARE PROGRESS NOTE    Supervising Wound Care / Hyperbaric Medicine Physician: Not Applicable  Consulting Provider:  Michelle Cueto MD  Date of Consultation/Last Comprehensive Exam:  2021    Referring  Provider:  Vicki Avila MD      SUBJECTIVE:    Chief Complaint:  Right leg ulcer      Wound/Ulcer Present:    Traumatic ulcer    Additional Wound Category:  None     Maximum Baseline Ambulatory Status:  Ambulates unassisted    History of Present Illness:  This is a 75 year old female with past medical history of COPD, hypertension, hyperlipidemia presents with right leg traumatic ulcer. It started one month ago when she hit it against a plastic garbage bin. Pt was seen by her primary care physician at the time.She was given Augmentin to cover for possible infection, there is no improvement.  She had ultrasound lower extremities which showed no major stenosis of the lower extremities.  She also has a new skin tear on her left shin area this occurred several days ago.  She did clean the area and she leaves open ear.     Interval History: Patient has no complaints of fever, redness or pain at ulcer site, excessive drainage or other new systemic symptoms. Tolerating current dressings well.    Review of Systems:  Pertinent items are noted in HPI (history of present illness).    Past Medical History:   Diagnosis Date   • Arthritis    • Elizondo's esophagus    • Chronic pain     buttocks to ankle Left -sciatica    • COPD (chronic obstructive pulmonary disease) (CMS/HCC)     emphysema   • Disorder of bone and cartilage, unspecified 2008   • Essential (primary) hypertension    • Gastroesophageal reflux disease     barrets esophagus   • High cholesterol    • Non-healing wound of lower extremity 2021   • Traumatic ulcer of right lower leg with fat layer exposed (CMS/HCC) 2021     Past Surgical History:    Procedure Laterality Date   • Appendectomy     • Breast surgery      Right breast biopsy   • Carpal tunnel release      Bilateral   •  section, classic      x 3    • Dexa bone density axial skeleton  2008   • Esophagogastroduodenoscopy     • Eye surgery      Bilateral Cataracts    • Gynecologic cryosurgery      tubal ligation    • Hysterectomy     • Oral surgery procedure     • Past surgical history  20 and 20    PSH of laser surgery both eyes.   [facility] at [facility] [place], [place]   • Removal gallbladder     • Service to gastroenterology      EGD and Colonoscopy   • Service to gastroenterology  2014    EGD- hx of Elizondo's esophagus    • Skin biopsy     •  vasc thoracic outlet bilateral       Social History     Socioeconomic History   • Marital status:      Spouse name: Not on file   • Number of children: 3   • Years of education: Not on file   • Highest education level: Not on file   Occupational History   • Not on file   Tobacco Use   • Smoking status: Current Every Day Smoker     Packs/day: 1.00     Years: 50.00     Pack years: 50.00     Types: Cigarettes   • Smokeless tobacco: Never Used   • Tobacco comment: 10-7-20, per pt trying to quit.   dmp   Vaping Use   • Vaping Use: never used   Substance and Sexual Activity   • Alcohol use: No     Alcohol/week: 0.0 standard drinks     Comment: Once yearly   • Drug use: No   • Sexual activity: Not Currently   Other Topics Concern   • Not on file   Social History Narrative   • Not on file     Social Determinants of Health     Financial Resource Strain:    • Social Determinants: Financial Resource Strain: Not on file   Food Insecurity:    • Social Determinants: Food Insecurity: Not on file   Transportation Needs:    • Lack of Transportation (Medical): Not on file   • Lack of Transportation (Non-Medical): Not on file   Physical Activity:    • Days of Exercise per Week: Not on file   • Minutes of Exercise per Session: Not  on file   Stress:    • Social Determinants: Stress: Not on file   Social Connections:    • Social Determinants: Social Connections: Not on file   Intimate Partner Violence:    • Social Determinants: Intimate Partner Violence Past Fear: Not on file   • Social Determinants: Intimate Partner Violence Current Fear: Not on file     Family History   Problem Relation Age of Onset   • Cancer Father         Kidney   • High cholesterol Father    • Hypertension Father    • Heart disease Brother    • Hypertension Brother    • High cholesterol Brother    • Depression Brother    • Early death Brother         suicide    • Heart disease Brother    • Hypertension Brother    • High cholesterol Brother    • Cancer Brother         bladder    • Heart disease Brother    • Hypertension Brother    • High cholesterol Brother    • High cholesterol Mother    • Hypertension Mother    • Depression Mother    • Psychiatric Mother         bipolar / schizoprenia    • High cholesterol Sister    • Hypertension Sister    • Hypertension Daughter    • Diabetes Daughter    • NEGATIVE FAMILY HX OF Son    • Depression Daughter        Current Outpatient Medications   Medication Sig   • ProAir  (90 Base) MCG/ACT inhaler INHALE 2 PUFFS BY MOUTH  EVERY 4 HOURS AS NEEDED FOR SHORTNESS OF BREATH OR  WHEEZING   • Wixela Inhub 250-50 MCG/DOSE inhaler USE 1 INHALATION BY MOUTH  TWICE DAILY   • omeprazole (PrilOSEC) 20 MG capsule TAKE 1 CAPSULE BY MOUTH  TWICE DAILY   • Spiriva HandiHaler 18 MCG capsule for inhaler INHALE THE CONTENTS OF 1  CAPSULE BY MOUTH VIA  HANDIHALER EVERY DAY   • sertraline (ZOLOFT) 25 MG tablet TAKE 1 TABLET BY MOUTH AT  BEDTIME (Patient taking differently: Take 25 mg by mouth nightly as needed. )   • estradiol (ESTRACE VAGINAL) 0.1 MG/GM vaginal cream Place 2 g vaginally daily. Place vaginally 2g daily for 14 days, then use 1g 1-3 times per week as maintenance   • Omega-3 Fatty Acids (Fish Oil) 1000 MG capsule Take 2 g by mouth  daily.   • melatonin 5 MG Take 5 mg by mouth nightly.   • metoPROLOL tartrate (LOPRESSOR) 25 MG tablet TAKE 1 TABLET BY MOUTH  EVERY 12 HOURS (Patient taking differently: Take 25 mg by mouth daily. Taking 0.5 tablet daily)   • atorvastatin (LIPITOR) 20 MG tablet TAKE 1 TABLET BY MOUTH  DAILY   • aspirin 81 MG tablet Take 1 tablet by mouth daily.   • nicotine (NICODERM) 21 MG/24HR patch Place 1 patch onto the skin every 24 hours.   • Cholecalciferol (VITAMIN D3) 2000 units Tab Take 1 tablet by mouth daily.   • Multiple Vitamins-Iron (MULTIVITAMIN/IRON PO) Take 1 tablet by mouth daily.    • calcium carbonate-vitamin D (CALTRATE+D) 600-400 MG-UNIT per tablet Take 1 tablet by mouth daily.      Current Facility-Administered Medications   Medication   • zoledronic acid (RECLAST) IVPB 5 mg        ALLERGIES:  Patient has no known allergies.    OBJECTIVE:  Vital Signs:    Visit Vitals  /61 (BP Location: RUE - Right upper extremity, Patient Position: Sitting)   Pulse 80   Temp 96.1 °F (35.6 °C) (Temporal)   Resp 20         Physical Exam:  General appearance: Appears stated age, Alert, oriented to person, place, time and situation, in no distress and cooperative  Pulmonary: normal respiratory effort     Left leg- fully epithelialized    Right medial leg- fully epithelialized. No surrounding erythema. No increased warmth.     Images obtained awaiting upload    Wound Bed Quality:  as above      Ashlee-wound Quality:    as above    Additional Descriptors:  as above    Wound Measurements Per Flowsheet:       Wound Leg Left Skin Tear (Active)   Wound Length (cm) 3.2 cm 09/07/21 1000   Wound Width (cm) 0.9 cm 09/07/21 1000   Wound Surface Area (cm^2) 2.88 cm^2 09/07/21 1000   Number of days: 49       Wound Leg Right Medial Traumatic (Active)   Wound Length (cm) 0.4 cm 10/26/21 1404   Wound Width (cm) 0.2 cm 10/26/21 1404   Wound Depth (cm) 0 cm 10/26/21 1404   Wound Surface Area (cm^2) 0.08 cm^2 10/26/21 1404   Wound Volume  (cm^3) 0 cm^3 10/26/21 1404   Number of days: 49         PROCEDURE:  Right leg ulcer   Not indicated    Procedure was Performed by:  Not applicable    Laboratory assessments reviewed:  No results found for: PAB   Albumin (g/dL)   Date Value   07/14/2021 4.2   01/11/2021 3.9   12/08/2020 3.9      No results available in last 24 hours    Lab Results   Component Value Date    WBC 12.1 (H) 01/11/2021    GLUCOSE 103 (H) 07/14/2021    RESR 12 03/20/2014    CREATININE 1.23 (H) 07/14/2021    GFRA 86 12/27/2019    GFRNA 74 12/27/2019        Culture results:  Specimen Description (no units)   Date Value   12/05/2018 URINE, CLEAN CATCH/MIDSTREAM   11/20/2018 URINE, CLEAN CATCH/MIDSTREAM   01/05/2015 URINE, CLEAN CATCH/MIDSTREAM    CULTURE (no units)   Date Value   12/05/2018 >100,000 CFU/mL ESCHERICHIA COLI (P)   11/20/2018 >100,000 CFU/mL ESCHERICHIA COLI (P)   01/05/2015 >100,000 CFU/mL ESCHERICHIA COLI (P)        Diagnostic Assessments Reviewed:  No new update  US LOWER EXTREMITY ARTERIES DUPLEX BILATERAL     HISTORY: \"Pain, unspecified, Edema, unspecified, Peripheral vascular  disease, unspecified (CMS/HCC)\"      IMPRESSION:  No arterial stenosis of either lower extremity identified.    XR ANKLE 3+ VW RIGHT     DATE:8/10/2021     INDICATION:  Pain, unspecified, Edema, unspecified.     COMPARISON:  None.     FINDINGS/I MPRESSION:  No fractures visualized. The ankle mortise and distal syndesmosis are  intact. The joint space is normal and there are smooth articular margins.  The subtalar articulation is normal.      Nutritional Assessment:  Prealbumin and/or Albumin reviewed    Wound treatment goals are palliative:  No    DIAGNOSES:  Traumatic wound Right leg ulcer    Medical Decision Making:   Ulcer appears to be fully epithelialized.  Had at length discussion regarding care following epithelialization and healing.Continue to offload areas of previous wounds to prevent breakdown of new fragile epithelium.Reinforced need for  [None] : Patient does not have any barriers to medication adherence continued local skin care.    No need to follow up in clinic, but if wounds re-occur, please call to make follow up appointment at the wound clinic.  Patient is very pleased with the progress. Patient indicates understanding and agreement with the discharge instructions.  Follow up with PCP in 2-3 weeks.          Plan of Care:  Advanced Wound Care Recommendations:  As above  Percent Wound Closure from consult:  As per flowsheets  Care plan to augment wound closure:  Wound closure greater than or equal to 30% at four weeks.                     RUPAL BUSBY MD  Office: [phone]   [address] [place] [address]  Pager: [phone]          [Takes medication as prescribed] : does not take

## 2022-03-14 NOTE — ASSESSMENT
[FreeTextEntry1] : Pt presents today for f/up in PreDM, HTN, \par \par RT hip calcific tendinosis:\par -PT referral\par -Orthopedic referral.\par \par PRE DM;\par -Continue  metformin.\par -HbA1c: 6.3%\par \par Left side Sciatic pain/ cervicalgia\par -Kenalog+ketorolac IM> given in 12/2020\par -PT referral> on hold\par -Xray 02/08/21> Narrow L4-5\par \par HTN: welll control.\par -Continue LOsartan-HCTZ\par \par recurrent UTI:\par -Resolved.\par \par Colonoscopy/ EGD: 2016 with Dr Carrero\par -repeat colonoscopy in 2026\par -EGD: Gastritis. on Omeprazole.\par \par Mammo: 04/21 BIRAD 2> Referral\par Gyn: Myomatous uterus/ Thickened endometrium.\par  f/up with Dr shields\par \par Chronic Constipation:\par -Miralax.\par \par Palpitations:\par -F/up with cardiology.\par F/up in 3 months.

## 2022-03-14 NOTE — PHYSICAL EXAM
[Normal] : normal rate, regular rhythm, normal S1 and S2 and no murmur heard [Soft] : abdomen soft [No HSM] : no HSM [Suprapubic] : in the suprapubic area [de-identified] : + left leg raise test

## 2022-03-14 NOTE — HISTORY OF PRESENT ILLNESS
[FreeTextEntry1] : F/up\par meds refills [de-identified] : 55 y/o HF with PreDM now on metformin, HTN on Losartan-HCTZ, GERD on omeprazole, recurrent UTI's, chronic constipation, left side sciatic pain.\par Pt is a poor historian.\par She is seen by gyn, dr Walton.\par She was seen by urology in the past for evaluation of recurrent UTI's and Microscopic hematuria. She had transvaginal USG which showed endometrial thickening and Uterina mYoma. She never f/up with Gyn as indicated several times.\par Reports episodes of palpitations. Last time seen by cardiology was 2018

## 2022-04-11 ENCOUNTER — RX RENEWAL (OUTPATIENT)
Age: 57
End: 2022-04-11

## 2022-06-13 ENCOUNTER — APPOINTMENT (OUTPATIENT)
Dept: FAMILY MEDICINE | Facility: CLINIC | Age: 57
End: 2022-06-13
Payer: COMMERCIAL

## 2022-06-13 VITALS
SYSTOLIC BLOOD PRESSURE: 120 MMHG | WEIGHT: 159 LBS | DIASTOLIC BLOOD PRESSURE: 78 MMHG | HEART RATE: 65 BPM | BODY MASS INDEX: 32.05 KG/M2 | RESPIRATION RATE: 16 BRPM | TEMPERATURE: 98.1 F | OXYGEN SATURATION: 98 % | HEIGHT: 59 IN

## 2022-06-13 LAB — HBA1C MFR BLD HPLC: 6.2

## 2022-06-13 PROCEDURE — 83036 HEMOGLOBIN GLYCOSYLATED A1C: CPT | Mod: QW

## 2022-06-13 PROCEDURE — 99214 OFFICE O/P EST MOD 30 MIN: CPT | Mod: 25

## 2022-06-13 NOTE — COUNSELING
FINISH LEXAPRO 10MG THAT YOU HAVE LEFT (6-7 DAYS). MAY START LEXAPRO 20MG DAILY.    [Fall prevention counseling provided] : Fall prevention counseling provided [Behavioral health counseling provided] : Behavioral health counseling provided [None] : None [Good understanding] : Patient has a good understanding of lifestyle changes and steps needed to achieve self management goal

## 2022-06-13 NOTE — HISTORY OF PRESENT ILLNESS
[FreeTextEntry1] : F/up\par meds refills [de-identified] : 57 y/o HF with PreDM now on metformin, HTN on Losartan-HCTZ, GERD on omeprazole, recurrent UTI's, chronic constipation, left side sciatic pain.\par Pt is a poor historian.\par She is seen by gyn, dr Walton.\par She was seen by urology in the past for evaluation of recurrent UTI's and Microscopic hematuria. She had transvaginal USG which showed endometrial thickening and Uterina mYoma. She never f/up with Gyn as indicated several times. She states was not able to be seen by her regular gyn, Dr Walton due to Insurance card ?\par Reports episodes of palpitations. Last time seen by cardiology was 2018

## 2022-06-13 NOTE — PHYSICAL EXAM
[Normal] : normal rate, regular rhythm, normal S1 and S2 and no murmur heard [Soft] : abdomen soft [No HSM] : no HSM [Suprapubic] : in the suprapubic area [de-identified] : + left leg raise test

## 2022-06-13 NOTE — ASSESSMENT
[FreeTextEntry1] : Pt presents today for f/up in PreDM, HTN, \par \par RT hip calcific tendinosis:\par -PT referral\par -Orthopedic referral.> on hold\par \par PRE DM;\par -Continue  metformin.\par -HbA1c: 6.2%\par \par Left side Sciatic pain/ cervicalgia\par -Kenalog+ketorolac IM> given in 12/2020\par -PT referral> on hold\par -Xray 02/08/21> Narrow L4-5\par \par HTN: welll control.\par -Continue LOsartan-HCTZ\par \par recurrent UTI:\par -Resolved.\par \par Colonoscopy/ EGD: 2016 with Dr Carrero\par -repeat colonoscopy in 2026\par -EGD: Gastritis. on Omeprazole.\par \par Mammo: 04/21 BIRAD 2> Referral\par Gyn: Myomatous uterus/ Thickened endometrium.\par  f/up with Dr shields> pending evaluation.\par New GYN referral.\par \par Chronic Constipation:\par -Miralax.\par \par Palpitations:\par -F/up with cardiology.\par F/up in 3 months.

## 2022-10-17 ENCOUNTER — APPOINTMENT (OUTPATIENT)
Dept: FAMILY MEDICINE | Facility: CLINIC | Age: 57
End: 2022-10-17
Payer: COMMERCIAL

## 2022-10-17 VITALS
WEIGHT: 160 LBS | HEART RATE: 75 BPM | OXYGEN SATURATION: 98 % | DIASTOLIC BLOOD PRESSURE: 74 MMHG | HEIGHT: 59 IN | RESPIRATION RATE: 14 BRPM | TEMPERATURE: 98 F | BODY MASS INDEX: 32.25 KG/M2 | SYSTOLIC BLOOD PRESSURE: 120 MMHG

## 2022-10-17 DIAGNOSIS — Z12.39 ENCOUNTER FOR OTHER SCREENING FOR MALIGNANT NEOPLASM OF BREAST: ICD-10-CM

## 2022-10-17 DIAGNOSIS — N39.0 URINARY TRACT INFECTION, SITE NOT SPECIFIED: ICD-10-CM

## 2022-10-17 DIAGNOSIS — R93.89 ABNORMAL FINDINGS ON DIAGNOSTIC IMAGING OF OTHER SPECIFIED BODY STRUCTURES: ICD-10-CM

## 2022-10-17 DIAGNOSIS — Z23 ENCOUNTER FOR IMMUNIZATION: ICD-10-CM

## 2022-10-17 LAB
BILIRUB UR QL STRIP: NORMAL
CLARITY UR: CLEAR
COLLECTION METHOD: NORMAL
GLUCOSE UR-MCNC: NORMAL
HBA1C MFR BLD HPLC: 6.1
HCG UR QL: 1 EU/DL
HGB UR QL STRIP.AUTO: NORMAL
KETONES UR-MCNC: NORMAL
LEUKOCYTE ESTERASE UR QL STRIP: NORMAL
NITRITE UR QL STRIP: NORMAL
PH UR STRIP: 6.5
PROT UR STRIP-MCNC: NORMAL
SP GR UR STRIP: 1.02

## 2022-10-17 PROCEDURE — 81003 URINALYSIS AUTO W/O SCOPE: CPT | Mod: NC,QW

## 2022-10-17 PROCEDURE — G0008: CPT

## 2022-10-17 PROCEDURE — 83036 HEMOGLOBIN GLYCOSYLATED A1C: CPT | Mod: QW

## 2022-10-17 PROCEDURE — 99214 OFFICE O/P EST MOD 30 MIN: CPT | Mod: 25

## 2022-10-17 PROCEDURE — 90686 IIV4 VACC NO PRSV 0.5 ML IM: CPT

## 2022-10-17 NOTE — PHYSICAL EXAM
[Normal] : normal rate, regular rhythm, normal S1 and S2 and no murmur heard [Soft] : abdomen soft [No HSM] : no HSM [Suprapubic] : in the suprapubic area [de-identified] : + left leg raise test

## 2022-10-17 NOTE — HEALTH RISK ASSESSMENT
[No] : In the past 12 months have you used drugs other than those required for medical reasons? No [Never] : Never [No falls in past year] : Patient reported no falls in the past year [1] : 2) Feeling down, depressed, or hopeless for several days (1) [PHQ-2 Positive] : PHQ-2 Positive [I have developed a follow-up plan documented below in the note.] : I have developed a follow-up plan documented below in the note. [[id number]] : 2

## 2022-10-17 NOTE — HISTORY OF PRESENT ILLNESS
[FreeTextEntry1] : F/up\par meds refills [de-identified] : 57 y/o HF with PreDM now on metformin, HTN on Losartan-HCTZ, GERD on omeprazole, recurrent UTI's, chronic constipation, left side sciatic pain.\par Pt is a poor historian.\par reports dysuria and lower abdominal pain.\par She was seen by gyn, dr Walton.\par She was seen by urology in the past for evaluation of recurrent UTI's and Microscopic hematuria. She had transvaginal USG which showed endometrial thickening and Uterina mYoma. She never f/up with Gyn as indicated several times. She states was not able to be seen by her regular gyn, Dr Walton due to Insurance card ?\par Reports episodes of palpitations. Last time seen by cardiology was 2018

## 2022-10-17 NOTE — ASSESSMENT
[FreeTextEntry1] : Pt presents today for f/up in PreDM, HTN, \par \par # Depression:\par -Start Venlafaxine\par -F/up in 3 months\par \par RT hip calcific tendinosis:\par -PT referral\par -Orthopedic referral.> on hold\par \par PRE DM;\par -Continue  metformin.\par -HbA1c: 6.1%\par \par Left side Sciatic pain/ cervicalgia\par -Kenalog+ketorolac IM> given in 12/2020\par -PT referral> on hold\par -Xray 02/08/21> Narrow L4-5\par \par HTN: welll control.\par -Continue LOsartan-HCTZ\par \par recurrent UTI:\par -UA: + leuk and dysuria\par -Start cipro x 3 days\par \par Colonoscopy/ EGD: 2016 with Dr Carrero\par -repeat colonoscopy in 2026\par -EGD: Gastritis. on Omeprazole.\par \par Mammo: 04/21 BIRAD 2> Referral\par Gyn: Myomatous uterus/ Thickened endometrium.\par  f/up with Dr shields> pending evaluation.\par New GYN referral.\par \par Chronic Constipation:\par -Miralax.\par \par Palpitations:\par -F/up with cardiology.\par F/up in 3 months.

## 2022-10-17 NOTE — REVIEW OF SYSTEMS
[Dysuria] : dysuria [Frequency] : frequency [Suicidal] : not suicidal [Insomnia] : no insomnia [Anxiety] : no anxiety [Depression] : depression [FreeTextEntry9] : RT hip pain

## 2022-10-25 ENCOUNTER — APPOINTMENT (OUTPATIENT)
Dept: OBGYN | Facility: CLINIC | Age: 57
End: 2022-10-25

## 2022-10-25 VITALS
DIASTOLIC BLOOD PRESSURE: 80 MMHG | HEIGHT: 59 IN | BODY MASS INDEX: 32.25 KG/M2 | SYSTOLIC BLOOD PRESSURE: 118 MMHG | WEIGHT: 160 LBS

## 2022-10-25 DIAGNOSIS — N81.6 RECTOCELE: ICD-10-CM

## 2022-10-25 DIAGNOSIS — Z13.820 ENCOUNTER FOR SCREENING FOR OSTEOPOROSIS: ICD-10-CM

## 2022-10-25 DIAGNOSIS — Z12.39 ENCOUNTER FOR OTHER SCREENING FOR MALIGNANT NEOPLASM OF BREAST: ICD-10-CM

## 2022-10-25 DIAGNOSIS — Z12.4 ENCOUNTER FOR SCREENING FOR MALIGNANT NEOPLASM OF CERVIX: ICD-10-CM

## 2022-10-25 DIAGNOSIS — Z01.411 ENCOUNTER FOR GYNECOLOGICAL EXAMINATION (GENERAL) (ROUTINE) WITH ABNORMAL FINDINGS: ICD-10-CM

## 2022-10-25 DIAGNOSIS — D25.9 LEIOMYOMA OF UTERUS, UNSPECIFIED: ICD-10-CM

## 2022-10-25 DIAGNOSIS — N81.0 URETHROCELE: ICD-10-CM

## 2022-10-25 PROCEDURE — 99202 OFFICE O/P NEW SF 15 MIN: CPT

## 2022-10-25 PROCEDURE — 99386 PREV VISIT NEW AGE 40-64: CPT | Mod: 25

## 2022-10-25 RX ORDER — CIPROFLOXACIN HYDROCHLORIDE 250 MG/1
250 TABLET, FILM COATED ORAL
Qty: 6 | Refills: 0 | Status: DISCONTINUED | COMMUNITY
Start: 2017-07-07 | End: 2022-10-25

## 2022-10-25 RX ORDER — POLYETHYLENE GLYCOL 3350 17 G/17G
17 POWDER, FOR SOLUTION ORAL DAILY
Qty: 1 | Refills: 3 | Status: DISCONTINUED | COMMUNITY
Start: 2021-06-07 | End: 2022-10-25

## 2022-10-25 NOTE — PHYSICAL EXAM
[Appropriately responsive] : appropriately responsive [Alert] : alert [No Acute Distress] : no acute distress [No Lymphadenopathy] : no lymphadenopathy [Oriented x3] : oriented x3 [Examination Of The Breasts] : a normal appearance [No Discharge] : no discharge [No Masses] : no breast masses were palpable [Labia Majora] : normal [Labia Minora] : normal [Urethrocele] : urethrocele [Rectocele] : a rectocele [Normal] : normal [Uterine Adnexae] : normal

## 2022-10-25 NOTE — DISCUSSION/SUMMARY
[FreeTextEntry1] : 1) pap performed\par 2) rx for screening mammo issued\par 3) rx for DEXA issued\par 4) pt noted to have significant rectocele, cystocele and mild uterine prolapse - upon inquiry, she has c/o urinary stress incontinence. Pt advised to f/u with urogyn for evaluation if desired as surgery likely needed to correct\par 5) Rx for TV sono issued for evalution of uterine fibroids and endometrium\par \par Return to office in one year, sooner prn

## 2022-10-25 NOTE — HISTORY OF PRESENT ILLNESS
[Currently In Menopause] : currently in menopause [Previously active] : previously active [FreeTextEntry1] : Kristie is a 55 y/o  who presents today for an annual exam. She has no complaints\par \par She states her PCP advised repeat pelvic imaging for surveillance of her uterine fibroid.  Her 12/10/189 pelvic sono on file notes a 2.8cm fibroid and a thickened endo with cystic changes. Pt states that she had a endo bx. with previous gyn\par \par Patient is Yi speaking. Yi speaking MA utilized for this encounter.

## 2022-10-26 LAB — HPV HIGH+LOW RISK DNA PNL CVX: NOT DETECTED

## 2022-10-28 LAB — CYTOLOGY CVX/VAG DOC THIN PREP: NORMAL

## 2022-10-29 ENCOUNTER — RESULT REVIEW (OUTPATIENT)
Age: 57
End: 2022-10-29

## 2022-10-29 ENCOUNTER — OUTPATIENT (OUTPATIENT)
Dept: OUTPATIENT SERVICES | Facility: HOSPITAL | Age: 57
LOS: 1 days | End: 2022-10-29
Payer: COMMERCIAL

## 2022-10-29 ENCOUNTER — APPOINTMENT (OUTPATIENT)
Dept: MAMMOGRAPHY | Facility: CLINIC | Age: 57
End: 2022-10-29

## 2022-10-29 DIAGNOSIS — Z12.31 ENCOUNTER FOR SCREENING MAMMOGRAM FOR MALIGNANT NEOPLASM OF BREAST: ICD-10-CM

## 2022-10-29 DIAGNOSIS — Z00.8 ENCOUNTER FOR OTHER GENERAL EXAMINATION: ICD-10-CM

## 2022-10-29 PROCEDURE — 77067 SCR MAMMO BI INCL CAD: CPT

## 2022-10-29 PROCEDURE — 77063 BREAST TOMOSYNTHESIS BI: CPT | Mod: 26

## 2022-10-29 PROCEDURE — 77063 BREAST TOMOSYNTHESIS BI: CPT

## 2022-10-29 PROCEDURE — 77067 SCR MAMMO BI INCL CAD: CPT | Mod: 26

## 2022-11-05 ENCOUNTER — APPOINTMENT (OUTPATIENT)
Dept: ULTRASOUND IMAGING | Facility: CLINIC | Age: 57
End: 2022-11-05

## 2022-11-05 ENCOUNTER — OUTPATIENT (OUTPATIENT)
Dept: OUTPATIENT SERVICES | Facility: HOSPITAL | Age: 57
LOS: 1 days | End: 2022-11-05
Payer: COMMERCIAL

## 2022-11-05 DIAGNOSIS — Z12.39 ENCOUNTER FOR OTHER SCREENING FOR MALIGNANT NEOPLASM OF BREAST: ICD-10-CM

## 2022-11-05 PROCEDURE — 76830 TRANSVAGINAL US NON-OB: CPT | Mod: 26

## 2022-11-05 PROCEDURE — 76830 TRANSVAGINAL US NON-OB: CPT

## 2022-11-07 ENCOUNTER — NON-APPOINTMENT (OUTPATIENT)
Age: 57
End: 2022-11-07

## 2022-11-07 DIAGNOSIS — R93.89 ABNORMAL FINDINGS ON DIAGNOSTIC IMAGING OF OTHER SPECIFIED BODY STRUCTURES: ICD-10-CM

## 2022-11-07 RX ORDER — MISOPROSTOL 200 UG/1
200 TABLET ORAL
Qty: 2 | Refills: 0 | Status: COMPLETED | COMMUNITY
Start: 2022-11-07 | End: 2022-11-09

## 2022-12-15 ENCOUNTER — APPOINTMENT (OUTPATIENT)
Dept: OBGYN | Facility: CLINIC | Age: 57
End: 2022-12-15

## 2022-12-15 PROCEDURE — 58558Z: CUSTOM | Mod: 53

## 2022-12-15 PROCEDURE — 99212 OFFICE O/P EST SF 10 MIN: CPT | Mod: 25

## 2022-12-22 ENCOUNTER — APPOINTMENT (OUTPATIENT)
Dept: OBGYN | Facility: CLINIC | Age: 57
End: 2022-12-22

## 2023-01-05 ENCOUNTER — APPOINTMENT (OUTPATIENT)
Dept: OBGYN | Facility: CLINIC | Age: 58
End: 2023-01-05
Payer: COMMERCIAL

## 2023-01-05 PROCEDURE — 58558Z: CUSTOM

## 2023-01-06 ENCOUNTER — LABORATORY RESULT (OUTPATIENT)
Age: 58
End: 2023-01-06

## 2023-01-06 ENCOUNTER — APPOINTMENT (OUTPATIENT)
Dept: OBGYN | Facility: HOSPITAL | Age: 58
End: 2023-01-06

## 2023-01-12 ENCOUNTER — APPOINTMENT (OUTPATIENT)
Dept: OBGYN | Facility: CLINIC | Age: 58
End: 2023-01-12
Payer: COMMERCIAL

## 2023-01-12 ENCOUNTER — APPOINTMENT (OUTPATIENT)
Dept: OBGYN | Facility: CLINIC | Age: 58
End: 2023-01-12

## 2023-01-12 VITALS — SYSTOLIC BLOOD PRESSURE: 108 MMHG | DIASTOLIC BLOOD PRESSURE: 72 MMHG | RESPIRATION RATE: 14 BRPM

## 2023-01-12 PROCEDURE — 99213 OFFICE O/P EST LOW 20 MIN: CPT

## 2023-01-12 NOTE — HISTORY OF PRESENT ILLNESS
[FreeTextEntry1] :  Rao\par 58yo s/p office hysteroscopy and EMB returns with malodorous urine and back pain.\par Patient has otherwise returned to normal activity.\par Pathology was benign and large polyp in the uterine cavity was noted.\par \par Gyn: normal cervix and vagina, POP noted\par Abd: soft, nontender\par \par Plan: \par Ibuprofen recommended for pain\par Explained to patient exam was normal.\par Patient will make surgical consult for hysteroscopy, polypectomy\par 20min spent \par CHRIS Oliva MD

## 2023-01-17 LAB
CYTOLOGY CVX/VAG DOC THIN PREP: ABNORMAL
HPV HIGH+LOW RISK DNA PNL CVX: DETECTED

## 2023-01-19 ENCOUNTER — NON-APPOINTMENT (OUTPATIENT)
Age: 58
End: 2023-01-19

## 2023-01-27 ENCOUNTER — APPOINTMENT (OUTPATIENT)
Dept: OBGYN | Facility: CLINIC | Age: 58
End: 2023-01-27
Payer: COMMERCIAL

## 2023-01-27 ENCOUNTER — LABORATORY RESULT (OUTPATIENT)
Age: 58
End: 2023-01-27

## 2023-01-27 LAB — HCG UR QL: NEGATIVE

## 2023-01-27 PROCEDURE — 57454 BX/CURETT OF CERVIX W/SCOPE: CPT

## 2023-01-27 NOTE — PROCEDURE
[Colposcopy] : Colposcopy  [Time out performed] : Pre-procedure time out performed.  Patient's name, date of birth and procedure confirmed. [Consent Obtained] : Consent obtained [Risks] : risks [Benefits] : benefits [Alternatives] : alternatives [Patient] : patient [Infection] : infection [Bleeding] : bleeding [Allergic Reaction] : allergic reaction [ASCUS] : ASCUS [HPV High Risk] : HPV high risk [Colposcopy Adequate] : colposcopy adequate [SCI Fully Visualized] : SCI fully visualized [ECC Performed] : ECC performed [No Abnormalities] : no abnormalities [Biopsy] : biopsy taken [Hemostasis Obtained] : Hemostasis obtained [Tolerated Well] : the patient tolerated the procedure well [de-identified] : 3 [de-identified] : 4, 9 , 12 o'clock [de-identified] : acetowhite at all locations

## 2023-01-27 NOTE — ASSESSMENT
[FreeTextEntry1] : nothing per vagina x 1wk\par \par TVUS discussed with patient and has thickened endometrium at 12mm with a benign EMB-- needs SHG. Patient agreed.

## 2023-02-07 ENCOUNTER — NON-APPOINTMENT (OUTPATIENT)
Age: 58
End: 2023-02-07

## 2023-02-13 ENCOUNTER — RX RENEWAL (OUTPATIENT)
Age: 58
End: 2023-02-13

## 2023-02-16 ENCOUNTER — APPOINTMENT (OUTPATIENT)
Dept: FAMILY MEDICINE | Facility: CLINIC | Age: 58
End: 2023-02-16
Payer: COMMERCIAL

## 2023-02-16 ENCOUNTER — RESULT CHARGE (OUTPATIENT)
Age: 58
End: 2023-02-16

## 2023-02-16 VITALS
TEMPERATURE: 97.9 F | RESPIRATION RATE: 16 BRPM | DIASTOLIC BLOOD PRESSURE: 80 MMHG | OXYGEN SATURATION: 98 % | SYSTOLIC BLOOD PRESSURE: 120 MMHG | HEIGHT: 59 IN | BODY MASS INDEX: 32.66 KG/M2 | HEART RATE: 79 BPM | WEIGHT: 162 LBS

## 2023-02-16 DIAGNOSIS — R87.810 ATYPICAL SQUAMOUS CELLS OF UNDETERMINED SIGNIFICANCE ON CYTOLOGIC SMEAR OF CERVIX (ASC-US): ICD-10-CM

## 2023-02-16 DIAGNOSIS — M25.552 PAIN IN RIGHT HIP: ICD-10-CM

## 2023-02-16 DIAGNOSIS — M25.551 PAIN IN RIGHT HIP: ICD-10-CM

## 2023-02-16 DIAGNOSIS — R87.610 ATYPICAL SQUAMOUS CELLS OF UNDETERMINED SIGNIFICANCE ON CYTOLOGIC SMEAR OF CERVIX (ASC-US): ICD-10-CM

## 2023-02-16 DIAGNOSIS — F32.A DEPRESSION, UNSPECIFIED: ICD-10-CM

## 2023-02-16 LAB — HBA1C MFR BLD HPLC: 6.3

## 2023-02-16 PROCEDURE — 83036 HEMOGLOBIN GLYCOSYLATED A1C: CPT | Mod: QW

## 2023-02-16 PROCEDURE — 99214 OFFICE O/P EST MOD 30 MIN: CPT | Mod: 25

## 2023-02-16 RX ORDER — NAPROXEN 500 MG/1
500 TABLET ORAL
Qty: 20 | Refills: 1 | Status: DISCONTINUED | COMMUNITY
Start: 2021-07-29 | End: 2023-02-16

## 2023-02-16 RX ORDER — CELECOXIB 200 MG/1
200 CAPSULE ORAL
Qty: 90 | Refills: 3 | Status: ACTIVE | COMMUNITY
Start: 2023-02-16 | End: 1900-01-01

## 2023-02-16 NOTE — HISTORY OF PRESENT ILLNESS
[FreeTextEntry1] : B/L Hip pain [de-identified] : 58 y/o HF with PreDM now on metformin, HTN on Losartan-HCTZ, GERD on omeprazole, recurrent UTI's, chronic constipation, left side sciatic pain.\par Pt is a poor historian.\par reports persistent B/L Hip pain with limited functionality.\par She was seen by gyn, dr THANIA Peña, s/p Endometril bxp: + HPV\par She was seen by urology in the past for evaluation of recurrent UTI's and Microscopic hematuria. \par Reports episodes of palpitations. Last time seen by cardiology was 2018

## 2023-02-16 NOTE — ASSESSMENT
[FreeTextEntry1] : Pt presents today for f/up in PreDM, HTN, \par \par # Depression:\par -On Venlafaxine?> not clear if taking med\par -F/up in 3 months\par \par RT hip calcific tendinosis> now B/L HIP pain\par -Orthopedic referral.\par \par PRE DM;\par -Continue  metformin.\par -HbA1c: 6.3%\par \par Left side Sciatic pain/ cervicalgia\par -Kenalog+ketorolac IM> given in 12/2020\par -PT referral> on hold\par -Xray 02/08/21> Narrow L4-5\par \par HTN: welll control.\par -Continue LOsartan-HCTZ\par \par recurrent UTI:\par -UA: + leuk and dysuria\par \par \par Colonoscopy/ EGD: 2016 with Dr Carrero\par -repeat colonoscopy in 2026\par -EGD: Gastritis. on Omeprazole.\par \par Mammo: 10/2022 BIRAD 1\par Gyn: Myomatous uterus/ Thickened endometrium./ ASCUS + HPV\par -S/P Endometrial Bxp 01/2023 with Dr Perez\par -Needs to repeat HPV/PAP in 1 year\par \par Chronic Constipation:\par -Miralax.\par \par Palpitations:\par -F/up with cardiology.\par F/up in 3 months.

## 2023-02-16 NOTE — PHYSICAL EXAM
[Normal] : normal rate, regular rhythm, normal S1 and S2 and no murmur heard [Soft] : abdomen soft [No HSM] : no HSM [Suprapubic] : in the suprapubic area [de-identified] : + left leg raise test

## 2023-02-16 NOTE — HEALTH RISK ASSESSMENT
[No] : In the past 12 months have you used drugs other than those required for medical reasons? No [No falls in past year] : Patient reported no falls in the past year [1] : 2) Feeling down, depressed, or hopeless for several days (1) [PHQ-2 Positive] : PHQ-2 Positive [I have developed a follow-up plan documented below in the note.] : I have developed a follow-up plan documented below in the note. [Never] : Never [de-identified] : GYN [[id number]] : 2

## 2023-02-16 NOTE — REVIEW OF SYSTEMS
[Dysuria] : dysuria [Frequency] : frequency [Depression] : depression [Suicidal] : not suicidal [Insomnia] : no insomnia [Anxiety] : no anxiety [FreeTextEntry9] : RT hip pain

## 2023-03-01 ENCOUNTER — APPOINTMENT (OUTPATIENT)
Dept: ORTHOPEDIC SURGERY | Facility: CLINIC | Age: 58
End: 2023-03-01
Payer: COMMERCIAL

## 2023-03-01 VITALS
SYSTOLIC BLOOD PRESSURE: 133 MMHG | BODY MASS INDEX: 32.66 KG/M2 | WEIGHT: 162 LBS | HEART RATE: 69 BPM | DIASTOLIC BLOOD PRESSURE: 77 MMHG | HEIGHT: 59 IN

## 2023-03-01 DIAGNOSIS — M65.20 CALCIFIC TENDINITIS, UNSPECIFIED SITE: ICD-10-CM

## 2023-03-01 DIAGNOSIS — M70.62 TROCHANTERIC BURSITIS, RIGHT HIP: ICD-10-CM

## 2023-03-01 DIAGNOSIS — M70.61 TROCHANTERIC BURSITIS, RIGHT HIP: ICD-10-CM

## 2023-03-01 PROCEDURE — 73523 X-RAY EXAM HIPS BI 5/> VIEWS: CPT

## 2023-03-01 PROCEDURE — 99204 OFFICE O/P NEW MOD 45 MIN: CPT

## 2023-03-01 NOTE — HISTORY OF PRESENT ILLNESS
[Worsening] : worsening [Sitting] : sitting [Standing] : standing [Daily] : ~He/She~ states the symptoms seem to be occuring daily [Bending] : worsened by bending [Direct Pressure] : worsened by direct pressure [Hip Movement] : worsened by hip movement [Walking] : worsened by walking [de-identified] : 57 year old female with past medical history of HTN, DM (does not recall last A1C) presents to office for initial evaluation of bilateral hip pain left worse than right that has been present for more than a year. The patient states the pain initially orginated in her lower back but is now present on the lateral aspects of both hips and radiates into her groin and down both lower extremities. The pain occurs daily. Exacerbated by walking, standing, navigating stairs, rising from a seated position, laying on either side.  Admits to numbness and tingling down bilateral lower extremities left worse than right.  She has been seeing her primary care doctor who prescribes her a medication for her muscles, she does not recall the class or name of the medication but states that it did not provide her any relief.  She has not been to physical therapy and denies a history of injections or surgeries in the hips.  Denies use of assistive device for ambulation.  Denies any recent injuries falls or trauma, locking catching or buckling of the lower extremities, mechanical symptoms of the hips, swelling of the lower extremities.\par \par Denies a history of smoking drug or alcohol use.

## 2023-03-01 NOTE — DISCUSSION/SUMMARY
[Medication Risks Reviewed] : Medication risks reviewed [Surgical risks reviewed] : Surgical risks reviewed [de-identified] : Patient is a 57-year-old female with bilateral hip greater trochanteric bursitis calcific tendinitis presenting today for initial evaluation.  She is not a try conservative treatment I think that is warranted at this time.  I have recommended bilateral hip greater trochanter cortisone injections and ordered that for her.  I recommended physical therapy.  She is to take Tylenol and NSAIDs as needed for the pain.  I will see her back on an as-needed basis for her bilateral hips.  All questions were asked and answered with an .

## 2023-03-01 NOTE — PHYSICAL EXAM
[de-identified] : GENERAL APPEARANCE: Well nourished and hydrated, pleasant, alert, and oriented x 3. Appears their stated age. \par HEENT: Normocephalic, extraocular eye motion intact. Nasal septum midline. Oral cavity clear. External auditory canal clear. \par RESPIRATORY: Breath sounds clear and audible in all lobes. No wheezing, No accessory muscle use.\par CARDIOVASCULAR: No apparent abnormalities. No lower leg edema. No varicosities. Pedal pulses are palpable.\par NEUROLOGIC: Sensation is normal, no muscle weakness in the upper or lower extremities.\par DERMATOLOGIC: No apparent skin lesions, moist, warm, no rash.\par SPINE: Cervical spine appears normal and moves freely; thoracic spine appears normal and moves freely; lumbosacral spine appears normal and moves freely, normal, nontender.\par MUSCULOSKELETAL: Hands, wrists, and elbows are normal and move freely, shoulders are normal and move freely. \par Psychiatric: Oriented to person, place, and time, insight and judgement were intact and the affect was normal. \par Musculoskeletal: ambulates normally. Right hip exam showed no groin pain with SLR, ROM is full without pain, MARSHA negative, FADIR negative\par Left hip exam showed no groin pain with SLR, ROM is full without pain, MARSHA negative FADIR of negative there is tenderness palpation over bilateral greater trochanter\par 5/5 motor strength in bilateral lower extremities. Sensory: Intact in bilateral lower extremities. DTRs: Biceps, brachioradialis, triceps, patellar, ankle and plantar 2+ and symmetric bilaterally. Pulses: dorsalis pedis, posterior tibial, femoral, popliteal, and radial 2+ and symmetric bilaterally.  [de-identified] : AP pelvis and 2 views of bilateral hips obtained the office today show no acute fractures or dislocations.  For the left hip there is evidence of calcific tendinitis

## 2023-03-01 NOTE — REASON FOR VISIT
[Follow-Up Visit] : a follow-up visit for [Ad Hoc ] : provided by an ad hoc  [FreeTextEntry2] : bilateral hip  pain  [Interpreters_IDNumber] : jeff [Interpreters_FullName] : [id number] [TWNoteComboBox1] : Q[place]

## 2023-03-27 ENCOUNTER — RESULT REVIEW (OUTPATIENT)
Age: 58
End: 2023-03-27

## 2023-03-27 ENCOUNTER — OUTPATIENT (OUTPATIENT)
Dept: OUTPATIENT SERVICES | Facility: HOSPITAL | Age: 58
LOS: 1 days | End: 2023-03-27

## 2023-03-27 ENCOUNTER — APPOINTMENT (OUTPATIENT)
Dept: ULTRASOUND IMAGING | Facility: CLINIC | Age: 58
End: 2023-03-27
Payer: COMMERCIAL

## 2023-03-27 DIAGNOSIS — M70.61 TROCHANTERIC BURSITIS, RIGHT HIP: ICD-10-CM

## 2023-03-27 PROCEDURE — 20611 DRAIN/INJ JOINT/BURSA W/US: CPT | Mod: LT

## 2023-04-05 ENCOUNTER — OUTPATIENT (OUTPATIENT)
Dept: OUTPATIENT SERVICES | Facility: HOSPITAL | Age: 58
LOS: 1 days | End: 2023-04-05
Payer: COMMERCIAL

## 2023-04-05 ENCOUNTER — APPOINTMENT (OUTPATIENT)
Dept: ULTRASOUND IMAGING | Facility: CLINIC | Age: 58
End: 2023-04-05
Payer: COMMERCIAL

## 2023-04-05 DIAGNOSIS — R93.89 ABNORMAL FINDINGS ON DIAGNOSTIC IMAGING OF OTHER SPECIFIED BODY STRUCTURES: ICD-10-CM

## 2023-04-05 PROCEDURE — 76831 ECHO EXAM UTERUS: CPT | Mod: 26

## 2023-04-05 PROCEDURE — 76831 ECHO EXAM UTERUS: CPT

## 2023-05-18 ENCOUNTER — APPOINTMENT (OUTPATIENT)
Dept: FAMILY MEDICINE | Facility: CLINIC | Age: 58
End: 2023-05-18
Payer: COMMERCIAL

## 2023-05-18 ENCOUNTER — RESULT CHARGE (OUTPATIENT)
Age: 58
End: 2023-05-18

## 2023-05-18 VITALS
RESPIRATION RATE: 15 BRPM | OXYGEN SATURATION: 98 % | TEMPERATURE: 98.2 F | DIASTOLIC BLOOD PRESSURE: 78 MMHG | WEIGHT: 163 LBS | HEART RATE: 79 BPM | BODY MASS INDEX: 32.86 KG/M2 | HEIGHT: 59 IN | SYSTOLIC BLOOD PRESSURE: 110 MMHG

## 2023-05-18 VITALS — HEIGHT: 59 IN | BODY MASS INDEX: 31.65 KG/M2 | WEIGHT: 157 LBS

## 2023-05-18 DIAGNOSIS — N84.0 POLYP OF CORPUS UTERI: ICD-10-CM

## 2023-05-18 DIAGNOSIS — Z01.818 ENCOUNTER FOR OTHER PREPROCEDURAL EXAMINATION: ICD-10-CM

## 2023-05-18 LAB — HBA1C MFR BLD HPLC: 5.6

## 2023-05-18 PROCEDURE — 83036 HEMOGLOBIN GLYCOSYLATED A1C: CPT | Mod: QW

## 2023-05-18 PROCEDURE — 99214 OFFICE O/P EST MOD 30 MIN: CPT | Mod: 25

## 2023-05-18 RX ORDER — SILVER SULFADIAZINE 10 MG/G
1 CREAM TOPICAL
Qty: 1 | Refills: 1 | Status: DISCONTINUED | COMMUNITY
Start: 2021-12-13 | End: 2023-05-18

## 2023-05-18 RX ORDER — MISOPROSTOL 200 UG/1
200 TABLET ORAL
Qty: 2 | Refills: 0 | Status: DISCONTINUED | COMMUNITY
Start: 2022-12-22 | End: 2023-05-18

## 2023-05-18 RX ORDER — VENLAFAXINE 37.5 MG/1
37.5 TABLET ORAL
Qty: 90 | Refills: 0 | Status: DISCONTINUED | COMMUNITY
Start: 2022-10-17 | End: 2023-05-18

## 2023-05-22 ENCOUNTER — APPOINTMENT (OUTPATIENT)
Dept: OBGYN | Facility: CLINIC | Age: 58
End: 2023-05-22
Payer: COMMERCIAL

## 2023-05-22 VITALS
BODY MASS INDEX: 32.86 KG/M2 | WEIGHT: 163 LBS | DIASTOLIC BLOOD PRESSURE: 76 MMHG | SYSTOLIC BLOOD PRESSURE: 116 MMHG | HEIGHT: 59 IN

## 2023-05-22 DIAGNOSIS — Z01.818 ENCOUNTER FOR OTHER PREPROCEDURAL EXAMINATION: ICD-10-CM

## 2023-05-22 PROCEDURE — 99214 OFFICE O/P EST MOD 30 MIN: CPT

## 2023-05-23 ENCOUNTER — OUTPATIENT (OUTPATIENT)
Dept: OUTPATIENT SERVICES | Facility: HOSPITAL | Age: 58
LOS: 1 days | End: 2023-05-23
Payer: COMMERCIAL

## 2023-05-23 VITALS
TEMPERATURE: 97 F | WEIGHT: 154.1 LBS | SYSTOLIC BLOOD PRESSURE: 142 MMHG | RESPIRATION RATE: 16 BRPM | HEART RATE: 70 BPM | HEIGHT: 59 IN | DIASTOLIC BLOOD PRESSURE: 71 MMHG | OXYGEN SATURATION: 100 %

## 2023-05-23 DIAGNOSIS — N84.0 POLYP OF CORPUS UTERI: ICD-10-CM

## 2023-05-23 DIAGNOSIS — Z90.49 ACQUIRED ABSENCE OF OTHER SPECIFIED PARTS OF DIGESTIVE TRACT: Chronic | ICD-10-CM

## 2023-05-23 DIAGNOSIS — Z01.818 ENCOUNTER FOR OTHER PREPROCEDURAL EXAMINATION: ICD-10-CM

## 2023-05-23 LAB
A1C WITH ESTIMATED AVERAGE GLUCOSE RESULT: 6.3 % — HIGH (ref 4–5.6)
ABO RH CONFIRMATION: SIGNIFICANT CHANGE UP
ANION GAP SERPL CALC-SCNC: 2 MMOL/L — LOW (ref 5–17)
APPEARANCE UR: CLEAR — SIGNIFICANT CHANGE UP
BACTERIA # UR AUTO: ABNORMAL
BASOPHILS # BLD AUTO: 0.07 K/UL — SIGNIFICANT CHANGE UP (ref 0–0.2)
BASOPHILS NFR BLD AUTO: 1.3 % — SIGNIFICANT CHANGE UP (ref 0–2)
BILIRUB UR-MCNC: ABNORMAL
BLD GP AB SCN SERPL QL: SIGNIFICANT CHANGE UP
BUN SERPL-MCNC: 18 MG/DL — SIGNIFICANT CHANGE UP (ref 7–23)
CALCIUM SERPL-MCNC: 9.4 MG/DL — SIGNIFICANT CHANGE UP (ref 8.5–10.1)
CHLORIDE SERPL-SCNC: 109 MMOL/L — HIGH (ref 96–108)
CO2 SERPL-SCNC: 31 MMOL/L — SIGNIFICANT CHANGE UP (ref 22–31)
COLOR SPEC: YELLOW — SIGNIFICANT CHANGE UP
COMMENT - URINE: SIGNIFICANT CHANGE UP
CREAT SERPL-MCNC: 0.84 MG/DL — SIGNIFICANT CHANGE UP (ref 0.5–1.3)
DIFF PNL FLD: ABNORMAL
EGFR: 80 ML/MIN/1.73M2 — SIGNIFICANT CHANGE UP
EOSINOPHIL # BLD AUTO: 0.04 K/UL — SIGNIFICANT CHANGE UP (ref 0–0.5)
EOSINOPHIL NFR BLD AUTO: 0.8 % — SIGNIFICANT CHANGE UP (ref 0–6)
EPI CELLS # UR: ABNORMAL
ESTIMATED AVERAGE GLUCOSE: 134 MG/DL — HIGH (ref 68–114)
GLUCOSE SERPL-MCNC: 110 MG/DL — HIGH (ref 70–99)
GLUCOSE UR QL: NEGATIVE — SIGNIFICANT CHANGE UP
HCT VFR BLD CALC: 44.9 % — SIGNIFICANT CHANGE UP (ref 34.5–45)
HGB BLD-MCNC: 15.3 G/DL — SIGNIFICANT CHANGE UP (ref 11.5–15.5)
IMM GRANULOCYTES NFR BLD AUTO: 0.2 % — SIGNIFICANT CHANGE UP (ref 0–0.9)
KETONES UR-MCNC: NEGATIVE — SIGNIFICANT CHANGE UP
LEUKOCYTE ESTERASE UR-ACNC: ABNORMAL
LYMPHOCYTES # BLD AUTO: 2.3 K/UL — SIGNIFICANT CHANGE UP (ref 1–3.3)
LYMPHOCYTES # BLD AUTO: 43.2 % — SIGNIFICANT CHANGE UP (ref 13–44)
MCHC RBC-ENTMCNC: 31.3 PG — SIGNIFICANT CHANGE UP (ref 27–34)
MCHC RBC-ENTMCNC: 34.1 GM/DL — SIGNIFICANT CHANGE UP (ref 32–36)
MCV RBC AUTO: 91.8 FL — SIGNIFICANT CHANGE UP (ref 80–100)
MONOCYTES # BLD AUTO: 0.32 K/UL — SIGNIFICANT CHANGE UP (ref 0–0.9)
MONOCYTES NFR BLD AUTO: 6 % — SIGNIFICANT CHANGE UP (ref 2–14)
NEUTROPHILS # BLD AUTO: 2.58 K/UL — SIGNIFICANT CHANGE UP (ref 1.8–7.4)
NEUTROPHILS NFR BLD AUTO: 48.5 % — SIGNIFICANT CHANGE UP (ref 43–77)
NITRITE UR-MCNC: NEGATIVE — SIGNIFICANT CHANGE UP
PH UR: 7 — SIGNIFICANT CHANGE UP (ref 5–8)
PLATELET # BLD AUTO: 220 K/UL — SIGNIFICANT CHANGE UP (ref 150–400)
POTASSIUM SERPL-MCNC: 3.5 MMOL/L — SIGNIFICANT CHANGE UP (ref 3.5–5.3)
POTASSIUM SERPL-SCNC: 3.5 MMOL/L — SIGNIFICANT CHANGE UP (ref 3.5–5.3)
PROT UR-MCNC: NEGATIVE — SIGNIFICANT CHANGE UP
RBC # BLD: 4.89 M/UL — SIGNIFICANT CHANGE UP (ref 3.8–5.2)
RBC # FLD: 13.7 % — SIGNIFICANT CHANGE UP (ref 10.3–14.5)
RBC CASTS # UR COMP ASSIST: ABNORMAL /HPF (ref 0–4)
SODIUM SERPL-SCNC: 142 MMOL/L — SIGNIFICANT CHANGE UP (ref 135–145)
SP GR SPEC: 1.01 — SIGNIFICANT CHANGE UP (ref 1.01–1.02)
UROBILINOGEN FLD QL: 1
WBC # BLD: 5.32 K/UL — SIGNIFICANT CHANGE UP (ref 3.8–10.5)
WBC # FLD AUTO: 5.32 K/UL — SIGNIFICANT CHANGE UP (ref 3.8–10.5)
WBC UR QL: SIGNIFICANT CHANGE UP /HPF (ref 0–5)

## 2023-05-23 PROCEDURE — 86850 RBC ANTIBODY SCREEN: CPT

## 2023-05-23 PROCEDURE — 83036 HEMOGLOBIN GLYCOSYLATED A1C: CPT

## 2023-05-23 PROCEDURE — 80048 BASIC METABOLIC PNL TOTAL CA: CPT

## 2023-05-23 PROCEDURE — 86900 BLOOD TYPING SEROLOGIC ABO: CPT

## 2023-05-23 PROCEDURE — 85025 COMPLETE CBC W/AUTO DIFF WBC: CPT

## 2023-05-23 PROCEDURE — 86901 BLOOD TYPING SEROLOGIC RH(D): CPT

## 2023-05-23 PROCEDURE — 81001 URINALYSIS AUTO W/SCOPE: CPT

## 2023-05-23 PROCEDURE — 93005 ELECTROCARDIOGRAM TRACING: CPT

## 2023-05-23 PROCEDURE — 36415 COLL VENOUS BLD VENIPUNCTURE: CPT

## 2023-05-23 PROCEDURE — 93010 ELECTROCARDIOGRAM REPORT: CPT

## 2023-05-23 PROCEDURE — 99214 OFFICE O/P EST MOD 30 MIN: CPT | Mod: 25

## 2023-05-23 NOTE — H&P PST ADULT - NSICDXPASTMEDICALHX_GEN_ALL_CORE_FT
PAST MEDICAL HISTORY:  Gastritis     Hypertension     Other depression     Type 2 diabetes mellitus     Uterine polyp

## 2023-05-23 NOTE — H&P PST ADULT - HISTORY OF PRESENT ILLNESS
57 year old poor historian  diagnosed with uterine polyp and thickened endometrium; presents to PST for planned D&C hysteroscopy

## 2023-05-23 NOTE — H&P PST ADULT - ASSESSMENT
57 year old female diagnosed with uterine polyp and thickened endometrium; presents to PST for planned D&C hysteroscopy     Plan:  1. PST instructions given ; NPO status/  instructions to be given by ASU   2. Pt instructed to take following meds on day of surgery:   3. Pt instructed to take routine evening medications unless indicated   4. Stop NSAIDS ( Aspirin Alev Motrin Mobic Diclofenac), herbal supplements , MVI , Vitamin fish oil 7 days prior to surgery  unless   directed by surgeon or cardiologist;   5. Medical Optimization  with    6. EZ wash instructions given & mupirocin instructions given  7. Labs EKG CXR as per surgeon request   8. Pt denies covid symptoms shortness of breath fever cough      57 year old female diagnosed with uterine polyp and thickened endometrium; presents to PST for planned D&C hysteroscopy     Plan:  1. PST instructions given ; NPO status/  instructions to be given by ASU   2. Pt instructed to take following meds on day of surgery: none   3. Pt instructed to take routine evening medications unless indicated   4. Stop NSAIDS ( Aspirin Alev Motrin Mobic Diclofenac), herbal supplements , MVI , Vitamin fish oil 7 days prior to surgery  unless   directed by surgeon or cardiologist;   5. Medical Optimization  with Dr Barrientos   6. Pt denies covid symptoms shortness of breath fever cough   7. Labs EKG CXR as per surgeon request

## 2023-05-23 NOTE — H&P PST ADULT - NSWEIGHTCALCTOOLDRUG_GEN_A_CORE
Vomiting, Then:     []  Bright Red Blood by Emesis     []  Streaking of Blood     []  Coffee Ground Appearance        If Stools, Then:    []  Bright Red Blood     []  Streaking of Blood     []  Camden Feng / Constantino Hawking     []  Blood on Tissue Paper     Recent Use of:     []  Ethanol Use     []  NSAID's Use     []  Steroid Use   History of:    []  GI Bleeding     []  Peptic Ulcer Disease     []  Esophageal Varicies     []  Liver Disease       []  Aortic Graft Surgery     []  Coagulopathy     [x]  Current Anticoagulant Use     ROS    Pertinent positives and negatives are stated within HPI, all other systems reviewed and are negative. Past Surgical History:   Procedure Laterality Date    BACK SURGERY      CYSTOSCOPY  4/8/16    Removal of Bladder Stone    ECHO COMPL W DOP COLOR FLOW  5/18/2012         OTHER SURGICAL HISTORY  11/28/15    lap choley and cholangiogram    OTHER SURGICAL HISTORY  4/8/16    Suprapubic Catheter Insertion   Social History:  reports that he has been smoking Cigarettes. He has a 5.00 pack-year smoking history. He has never used smokeless tobacco. He reports that he does not drink alcohol or use drugs. Family History: family history includes Heart Disease in his mother. Allergies: Patient has no known allergies. Physical Exam           ED Triage Vitals [03/07/18 1535]   BP Temp Temp src Pulse Resp SpO2 Height Weight   (!) 161/78 98.9 °F (37.2 °C) -- 67 -- 95 % -- --      Oxygen Saturation Interpretation: Normal.    Constitutional:  Alert, development consistent with age. NAD  Eyes:  PERRL, EOMI, no discharge or conjunctival injection. Ears:  External ears without lesions. Throat:  Pharynx without injection, exudate, or tonsillar hypertrophy. Airway patient. Neck:  Normal ROM. Supple. No meningeal signs  Respiratory:  Clear to auscultation and breath sounds equal.  CV:  Regular rate and rhythm, normal heart sounds, without pathological murmurs, ectopy, gallops, or rubs.   GI:  General Appearance: normal. Patient has a suprapubic catheter in place in the abdomen. There are no surrounding signs of erythema or infection. Catheter is draining yellow urine, no obvious sings of blood. Bowel sounds: normal bowel sounds. Distension:  None. Tenderness: No abdominal tenderness. Liver: non-tender. Spleen:  non-tender. Pulsatile Mass: absent. /Rectal: (chaperone present during examination). no tenderness noted, stool guaiac positive. No gross blood noted on gloved finger. Back: CVA Tenderness: No.  Integument:  Normal turgor. Warm, dry, without visible rash, unless noted elsewhere. Lymphatic: no lymphadenopathy noted  Neurological:  Oriented. Motor functions intact.     Lab / Imaging Results   (All laboratory and radiology results have been personally reviewed by myself)  Labs:  Results for orders placed or performed during the hospital encounter of 03/07/18   CBC auto differential   Result Value Ref Range    WBC 5.8 4.5 - 11.5 E9/L    RBC 2.03 (L) 3.80 - 5.80 E12/L    Hemoglobin 6.1 (LL) 12.5 - 16.5 g/dL    Hematocrit 20.6 (L) 37.0 - 54.0 %    .5 (H) 80.0 - 99.9 fL    MCH 30.0 26.0 - 35.0 pg    MCHC 29.6 (L) 32.0 - 34.5 %    RDW 15.2 (H) 11.5 - 15.0 fL    Platelets 673 (H) 876 - 450 E9/L    MPV 8.0 7.0 - 12.0 fL    Neutrophils % 44.3 43.0 - 80.0 %    Immature Granulocytes % 0.3 0.0 - 5.0 %    Lymphocytes % 41.1 20.0 - 42.0 %    Monocytes % 6.2 2.0 - 12.0 %    Eosinophils % 7.9 (H) 0.0 - 6.0 %    Basophils % 0.2 0.0 - 2.0 %    Neutrophils # 2.56 1.80 - 7.30 E9/L    Immature Granulocytes # 0.02 E9/L    Lymphocytes # 2.38 1.50 - 4.00 E9/L    Monocytes # 0.36 0.10 - 0.95 E9/L    Eosinophils # 0.46 0.05 - 0.50 E9/L    Basophils # 0.01 0.00 - 0.20 E9/L   Comprehensive Metabolic Panel   Result Value Ref Range    Sodium 140 132 - 146 mmol/L    Potassium 5.4 (H) 3.5 - 5.0 mmol/L    Chloride 103 98 - 107 mmol/L    CO2 26 22 - 29 mmol/L Anion Gap 11 7 - 16 mmol/L    Glucose 95 74 - 109 mg/dL    BUN 26 (H) 8 - 23 mg/dL    CREATININE 1.8 (H) 0.7 - 1.2 mg/dL    GFR Non-African American 46 >=60 mL/min/1.73    GFR African American 46     Calcium 9.3 8.6 - 10.2 mg/dL    Total Protein 6.8 6.4 - 8.3 g/dL    Alb 3.5 3.5 - 5.2 g/dL    Total Bilirubin <0.2 0.0 - 1.2 mg/dL    Alkaline Phosphatase 102 40 - 129 U/L    ALT 8 0 - 40 U/L    AST 13 0 - 39 U/L   Troponin   Result Value Ref Range    Troponin 0.01 0.00 - 0.03 ng/mL     Imaging: All Radiology results interpreted by Radiologist unless otherwise noted. No orders to display       ED Course / Medical Decision Making     Medications   0.9 % sodium chloride bolus (not administered)   sodium chloride flush 0.9 % injection 10 mL (not administered)   0.9 % sodium chloride bolus (1,000 mLs Intravenous New Bag 3/7/18 1900)   pantoprazole (PROTONIX) injection 40 mg (40 mg Intravenous Given 3/7/18 2032)     ED Course as of Mar 07 2043   Wed Mar 07, 2018   1927 ATTENDING PROVIDER ATTESTATION:     I have personally performed and/or participated in the history, exam, medical decision making, and procedures and agree with all pertinent clinical information unless otherwise noted. I have also reviewed and agree with the past medical, family and social history unless otherwise noted. History: Patient sent here after he was found to be anemic on routine blood testing at the local nursing facility. Patient denies complaints    My findings: Delores Peters is a 77 y.o. male whom is in no distress. Physical exam reveals no abdominal pain to palpation. Guaiac positive brown stool. Heart is regular, lungs clear. No other acute findings. My plan: Symptomatic and supportive care. Labs, type and screen. Fluids. Admission. [TG]      ED Course User Index  [TG] Rachel Ying DO     Re-Evaluations:  3/7/18      Time: 1815    Patient is resting comfortably on cart at this time. Patient has no symptoms.  Patient was  used

## 2023-05-23 NOTE — H&P PST ADULT - NSICDXPROCEDURE_GEN_ALL_CORE_FT
PROCEDURES:  D&C (dilatation and curettage, scraping of uterus) 23-May-2023 14:05:07  Aisha Fowler  Polypectomy, uterus 23-May-2023 14:05:27  Aisha Fowler

## 2023-05-24 DIAGNOSIS — N84.0 POLYP OF CORPUS UTERI: ICD-10-CM

## 2023-05-24 DIAGNOSIS — Z01.818 ENCOUNTER FOR OTHER PREPROCEDURAL EXAMINATION: ICD-10-CM

## 2023-05-24 NOTE — CURRENT MEDS
[None] : Patient does not have any barriers to medication adherence [Takes medication as prescribed] : does not take [FreeTextEntry1] : Not taking metformin

## 2023-05-24 NOTE — ASSESSMENT
[High Risk Surgery - Intraperitoneal, Intrathoracic or Supringuinal Vascular Procedures] : High Risk Surgery - Intraperitoneal, Intrathoracic or Supringuinal Vascular Procedures - No (0) [Ischemic Heart Disease] : Ischemic Heart Disease - No (0) [Congestive Heart Failure] : Congestive Heart Failure - No (0) [Prior Cerebrovascular Accident or TIA] : Prior Cerebrovascular Accident or TIA - No (0) [Creatinine >= 2mg/dL (1 Point)] : Creatinine >= 2mg/dL - No (0) [Insulin-dependent Diabetic (1 Point)] : Insulin-dependent Diabetic - No (0) [0] : 0 , RCRI Class: I, Risk of Post-Op Cardiac Complications: 3.9%, 95% CI for Risk Estimate: 2.8% - 5.4% [Modify medications prior to procedure] : Modify medications prior to procedure [As per surgery] : as per surgery [FreeTextEntry4] : Pending pre surgical testing. [FreeTextEntry7] : Hold Celecoxib 5 days before procedure.

## 2023-05-24 NOTE — HISTORY OF PRESENT ILLNESS
[No Pertinent Cardiac History] : no history of aortic stenosis, atrial fibrillation, coronary artery disease, recent myocardial infarction, or implantable device/pacemaker [No Pertinent Pulmonary History] : no history of asthma, COPD, sleep apnea, or smoking [No Adverse Anesthesia Reaction] : no adverse anesthesia reaction in self or family member [Diabetes] : diabetes [(Patient denies any chest pain, claudication, dyspnea on exertion, orthopnea, palpitations or syncope)] : Patient denies any chest pain, claudication, dyspnea on exertion, orthopnea, palpitations or syncope [Chronic Anticoagulation] : no chronic anticoagulation [Chronic Kidney Disease] : no chronic kidney disease [FreeTextEntry1] : Hysteroscopy, D&C [FreeTextEntry2] : 5/31/23 [FreeTextEntry3] : Dr JOHN Ruiz [FreeTextEntry4] : 58 y/o HF with PreDM OFF on metformin, HTN on Losartan-HCTZ, GERD on omeprazole, recurrent UTI's, chronic constipation, left side sciatic pain.\par Pt is a poor historian.\par persistent B/L Hip pain with limited functionality.Seen by ORTHO> Dx Bursitis both hips\par She was seen by gyn, dr THANIA Peña, s/p Endometril bxp: + HPV> SonoHysterogram done 4/05/23> Uterine POlyps x 2.\par Pt is here for medical clearance.\par \par

## 2023-05-24 NOTE — PLAN
[FreeTextEntry1] : Presurgical testing and EKG reviewed.\par Pt with no absolute contraindication for surgical procedure. Clear from clinical stand point.

## 2023-05-31 ENCOUNTER — TRANSCRIPTION ENCOUNTER (OUTPATIENT)
Age: 58
End: 2023-05-31

## 2023-05-31 ENCOUNTER — APPOINTMENT (OUTPATIENT)
Dept: OBGYN | Facility: HOSPITAL | Age: 58
End: 2023-05-31

## 2023-05-31 ENCOUNTER — OUTPATIENT (OUTPATIENT)
Dept: INPATIENT UNIT | Facility: HOSPITAL | Age: 58
LOS: 1 days | Discharge: HOME | End: 2023-05-31
Payer: COMMERCIAL

## 2023-05-31 ENCOUNTER — RESULT REVIEW (OUTPATIENT)
Age: 58
End: 2023-05-31

## 2023-05-31 VITALS
HEART RATE: 63 BPM | HEIGHT: 59 IN | OXYGEN SATURATION: 100 % | WEIGHT: 160.06 LBS | RESPIRATION RATE: 16 BRPM | SYSTOLIC BLOOD PRESSURE: 129 MMHG | TEMPERATURE: 98 F | DIASTOLIC BLOOD PRESSURE: 66 MMHG

## 2023-05-31 VITALS
DIASTOLIC BLOOD PRESSURE: 65 MMHG | HEART RATE: 52 BPM | OXYGEN SATURATION: 99 % | SYSTOLIC BLOOD PRESSURE: 146 MMHG | TEMPERATURE: 97 F | RESPIRATION RATE: 18 BRPM

## 2023-05-31 DIAGNOSIS — R93.89 ABNORMAL FINDINGS ON DIAGNOSTIC IMAGING OF OTHER SPECIFIED BODY STRUCTURES: ICD-10-CM

## 2023-05-31 DIAGNOSIS — N84.0 POLYP OF CORPUS UTERI: ICD-10-CM

## 2023-05-31 DIAGNOSIS — Z90.49 ACQUIRED ABSENCE OF OTHER SPECIFIED PARTS OF DIGESTIVE TRACT: Chronic | ICD-10-CM

## 2023-05-31 PROCEDURE — 88305 TISSUE EXAM BY PATHOLOGIST: CPT

## 2023-05-31 PROCEDURE — 88305 TISSUE EXAM BY PATHOLOGIST: CPT | Mod: 26

## 2023-05-31 PROCEDURE — 58558 HYSTEROSCOPY BIOPSY: CPT

## 2023-05-31 RX ORDER — KETOROLAC TROMETHAMINE 30 MG/ML
15 SYRINGE (ML) INJECTION ONCE
Refills: 0 | Status: DISCONTINUED | OUTPATIENT
Start: 2023-05-31 | End: 2023-05-31

## 2023-05-31 RX ORDER — VENLAFAXINE HCL 75 MG
1 CAPSULE, EXT RELEASE 24 HR ORAL
Refills: 0 | DISCHARGE

## 2023-05-31 RX ORDER — ACETAMINOPHEN 500 MG
1000 TABLET ORAL ONCE
Refills: 0 | Status: COMPLETED | OUTPATIENT
Start: 2023-05-31 | End: 2023-05-31

## 2023-05-31 RX ORDER — METFORMIN HYDROCHLORIDE 850 MG/1
1 TABLET ORAL
Refills: 0 | DISCHARGE

## 2023-05-31 RX ORDER — OMEPRAZOLE 10 MG/1
1 CAPSULE, DELAYED RELEASE ORAL
Refills: 0 | DISCHARGE

## 2023-05-31 RX ORDER — CELECOXIB 200 MG/1
1 CAPSULE ORAL
Refills: 0 | DISCHARGE

## 2023-05-31 RX ORDER — HYDROMORPHONE HYDROCHLORIDE 2 MG/ML
0.5 INJECTION INTRAMUSCULAR; INTRAVENOUS; SUBCUTANEOUS
Refills: 0 | Status: DISCONTINUED | OUTPATIENT
Start: 2023-05-31 | End: 2023-05-31

## 2023-05-31 RX ORDER — LOSARTAN/HYDROCHLOROTHIAZIDE 100MG-25MG
1 TABLET ORAL
Refills: 0 | DISCHARGE

## 2023-05-31 RX ORDER — ACETAMINOPHEN 500 MG
3 TABLET ORAL
Qty: 84 | Refills: 0
Start: 2023-05-31 | End: 2023-06-06

## 2023-05-31 RX ORDER — FENTANYL CITRATE 50 UG/ML
50 INJECTION INTRAVENOUS
Refills: 0 | Status: DISCONTINUED | OUTPATIENT
Start: 2023-05-31 | End: 2023-05-31

## 2023-05-31 RX ADMIN — Medication 15 MILLIGRAM(S): at 11:07

## 2023-05-31 RX ADMIN — Medication 400 MILLIGRAM(S): at 11:06

## 2023-05-31 RX ADMIN — Medication 1000 MILLIGRAM(S): at 11:30

## 2023-05-31 RX ADMIN — Medication 15 MILLIGRAM(S): at 11:30

## 2023-05-31 NOTE — ASU DISCHARGE PLAN (ADULT/PEDIATRIC) - NS MD DC FALL RISK RISK
For information on Fall & Injury Prevention, visit: [email or web address] OR  [email or web address] OR  https://www.cdc.gov/steadi/patient.html

## 2023-05-31 NOTE — BRIEF OPERATIVE NOTE - SPECIMENS
myosure shavings, endometrial polyp, endometrial curettings endometrial polyp, endometrial curettings

## 2023-05-31 NOTE — ASU PREOP CHECKLIST - NSBLOODTRANSFCONSENT_GEN_A_CORE
no
Patient presented with epigastric and LUQ abdominal pain, (+) hx pancreatitis in the past. Otherwise afebrile, HD stable, but (+) tender on exam. Labs remarkable for elevated lipase with mildly elevated non-specific leukocytosis. CT abd/pelvis confirmed (+) acute pancreatitis. Given the above, will require admission for further monitoring and management. Patient agreeable with plan. HD stable at time of admission.

## 2023-05-31 NOTE — BRIEF OPERATIVE NOTE - NSICDXBRIEFPREOP_GEN_ALL_CORE_FT
PRE-OP DIAGNOSIS:  Abnormal uterine bleeding 31-May-2023 10:38:16  Rosette Farnsworth  Endometrial polyp 31-May-2023 10:38:26  Rosette Farnsworth   PRE-OP DIAGNOSIS:  Endometrial polyp 31-May-2023 10:38:26  Rosette Farnsworth

## 2023-05-31 NOTE — BRIEF OPERATIVE NOTE - OPERATION/FINDINGS
Atrophic endometrium with polypoid tissue. Left 1cm endometrial polyp and right 2cm endometrial polyp. [place] visable bilaterally at end of case. Atrophic endometrium with polypoid tissue. Left 1cm endometrial polyp and right 2cm endometrial polyp. [place] visible bilaterally at end of resection; no gross vulvar/vaginal or cervical lesions

## 2023-05-31 NOTE — BRIEF OPERATIVE NOTE - NSICDXBRIEFPOSTOP_GEN_ALL_CORE_FT
POST-OP DIAGNOSIS:  Endometrial polyp 31-May-2023 10:38:36  Rosette Farnsworth  Abnormal uterine bleeding 31-May-2023 10:38:32  Rosette Farnsworth   POST-OP DIAGNOSIS:  Endometrial polyp 31-May-2023 10:38:36  Rosette Farnsworth

## 2023-05-31 NOTE — ASU DISCHARGE PLAN (ADULT/PEDIATRIC) - CARE PROVIDER_API CALL
Samuel Olivas  Obstetrics and Gynecology  [address] [place] [address]  Phone: [phone]  Fax: [phone]  Follow Up Time:

## 2023-05-31 NOTE — BRIEF OPERATIVE NOTE - NSICDXBRIEFPROCEDURE_GEN_ALL_CORE_FT
PROCEDURES:  Hysteroscopy with polypectomy and dilation of cervix with curettage procedure 31-May-2023 10:38:08  Rosette Farnsworth

## 2023-06-01 ENCOUNTER — NON-APPOINTMENT (OUTPATIENT)
Age: 58
End: 2023-06-01

## 2023-06-02 LAB — SURGICAL PATHOLOGY STUDY: SIGNIFICANT CHANGE UP

## 2023-06-04 DIAGNOSIS — M54.32 SCIATICA, LEFT SIDE: ICD-10-CM

## 2023-06-04 DIAGNOSIS — E11.9 TYPE 2 DIABETES MELLITUS WITHOUT COMPLICATIONS: ICD-10-CM

## 2023-06-04 DIAGNOSIS — N84.0 POLYP OF CORPUS UTERI: ICD-10-CM

## 2023-06-04 DIAGNOSIS — K21.9 GASTRO-ESOPHAGEAL REFLUX DISEASE WITHOUT ESOPHAGITIS: ICD-10-CM

## 2023-06-04 DIAGNOSIS — Z79.899 OTHER LONG TERM (CURRENT) DRUG THERAPY: ICD-10-CM

## 2023-06-04 DIAGNOSIS — Z79.84 LONG TERM (CURRENT) USE OF ORAL HYPOGLYCEMIC DRUGS: ICD-10-CM

## 2023-06-04 DIAGNOSIS — I10 ESSENTIAL (PRIMARY) HYPERTENSION: ICD-10-CM

## 2023-06-12 ENCOUNTER — APPOINTMENT (OUTPATIENT)
Dept: OBGYN | Facility: CLINIC | Age: 58
End: 2023-06-12
Payer: COMMERCIAL

## 2023-06-12 VITALS
WEIGHT: 163 LBS | BODY MASS INDEX: 32.86 KG/M2 | HEIGHT: 59 IN | DIASTOLIC BLOOD PRESSURE: 66 MMHG | SYSTOLIC BLOOD PRESSURE: 112 MMHG

## 2023-06-12 DIAGNOSIS — Z09 ENCOUNTER FOR FOLLOW-UP EXAMINATION AFTER COMPLETED TREATMENT FOR CONDITIONS OTHER THAN MALIGNANT NEOPLASM: ICD-10-CM

## 2023-06-12 DIAGNOSIS — R39.9 UNSPECIFIED SYMPTOMS AND SIGNS INVOLVING THE GENITOURINARY SYSTEM: ICD-10-CM

## 2023-06-12 DIAGNOSIS — Z48.89 ENCOUNTER FOR OTHER SPECIFIED SURGICAL AFTERCARE: ICD-10-CM

## 2023-06-12 PROCEDURE — 99212 OFFICE O/P EST SF 10 MIN: CPT

## 2023-06-12 NOTE — PLAN
[de-identified] : 1. UA, UC sent 2. benign path and intraop findings reviewed w patient 3. RTO annual exam or prn

## 2023-06-12 NOTE — HISTORY OF PRESENT ILLNESS
[None] : no vaginal bleeding [Pathology reviewed] : pathology reviewed [de-identified] : Doing well. Tolerating diet and ambulation without difficulties. No postop pain but reports some urinary discomfort.

## 2023-06-13 LAB
APPEARANCE: CLEAR
BACTERIA: NEGATIVE /HPF
BILIRUBIN URINE: NEGATIVE
BLOOD URINE: NEGATIVE
CAST: 0 /LPF
COLOR: YELLOW
EPITHELIAL CELLS: 1 /HPF
GLUCOSE QUALITATIVE U: NEGATIVE MG/DL
KETONES URINE: NEGATIVE MG/DL
LEUKOCYTE ESTERASE URINE: NEGATIVE
MICROSCOPIC-UA: NORMAL
NITRITE URINE: NEGATIVE
PH URINE: 7.5
PROTEIN URINE: NEGATIVE MG/DL
RED BLOOD CELLS URINE: 3 /HPF
SPECIFIC GRAVITY URINE: 1.01
UROBILINOGEN URINE: 0.2 MG/DL
WHITE BLOOD CELLS URINE: 1 /HPF

## 2023-06-14 LAB — BACTERIA UR CULT: NORMAL

## 2023-06-15 ENCOUNTER — NON-APPOINTMENT (OUTPATIENT)
Age: 58
End: 2023-06-15

## 2023-06-26 NOTE — ASU PREOP CHECKLIST - DENTURES
Biometrics completed.    Results reviewed with a Registered Nurse; understanding of results and   educational materials was verbalized.  
yes

## 2023-06-29 NOTE — HISTORY OF PRESENT ILLNESS
The site was marked. Prepped: abdomen. Prepped with: ChloraPrep. The patient was draped. Bilateral lower abdomin [FreeTextEntry1] : Glucose check\par LFT's [de-identified] : Pt is a poor hx and difficulty to verbalize.\par Pt was seen by urology for evaluation of microscopic hematuria. Cystoscopy done .\par Pt had USG: Endometrial thickening and Myomatous Uterus. Pt was advise to have gyn eval.\par \par Pt today states lost Mammo referral and Gyn referral.

## 2023-12-18 ENCOUNTER — RX RENEWAL (OUTPATIENT)
Age: 58
End: 2023-12-18

## 2024-01-23 PROBLEM — I10 ESSENTIAL (PRIMARY) HYPERTENSION: Chronic | Status: ACTIVE | Noted: 2023-05-23

## 2024-01-23 PROBLEM — F32.89 OTHER SPECIFIED DEPRESSIVE EPISODES: Chronic | Status: ACTIVE | Noted: 2023-05-23

## 2024-01-23 PROBLEM — K29.70 GASTRITIS, UNSPECIFIED, WITHOUT BLEEDING: Chronic | Status: ACTIVE | Noted: 2023-05-23

## 2024-01-23 PROBLEM — E11.9 TYPE 2 DIABETES MELLITUS WITHOUT COMPLICATIONS: Chronic | Status: ACTIVE | Noted: 2023-05-23

## 2024-01-23 PROBLEM — N84.0 POLYP OF CORPUS UTERI: Chronic | Status: ACTIVE | Noted: 2023-05-23

## 2024-02-15 ENCOUNTER — RX RENEWAL (OUTPATIENT)
Age: 59
End: 2024-02-15

## 2024-02-21 ENCOUNTER — APPOINTMENT (OUTPATIENT)
Dept: FAMILY MEDICINE | Facility: CLINIC | Age: 59
End: 2024-02-21
Payer: COMMERCIAL

## 2024-02-21 ENCOUNTER — LABORATORY RESULT (OUTPATIENT)
Age: 59
End: 2024-02-21

## 2024-02-21 VITALS
DIASTOLIC BLOOD PRESSURE: 84 MMHG | WEIGHT: 175 LBS | HEART RATE: 73 BPM | BODY MASS INDEX: 35.28 KG/M2 | OXYGEN SATURATION: 98 % | SYSTOLIC BLOOD PRESSURE: 140 MMHG | HEIGHT: 59 IN | RESPIRATION RATE: 14 BRPM

## 2024-02-21 DIAGNOSIS — Z82.49 FAMILY HISTORY OF ISCHEMIC HEART DISEASE AND OTHER DISEASES OF THE CIRCULATORY SYSTEM: ICD-10-CM

## 2024-02-21 DIAGNOSIS — Z84.1 FAMILY HISTORY OF DISORDERS OF KIDNEY AND URETER: ICD-10-CM

## 2024-02-21 DIAGNOSIS — Z83.3 FAMILY HISTORY OF DIABETES MELLITUS: ICD-10-CM

## 2024-02-21 PROCEDURE — 36415 COLL VENOUS BLD VENIPUNCTURE: CPT

## 2024-02-21 PROCEDURE — 99214 OFFICE O/P EST MOD 30 MIN: CPT

## 2024-02-21 RX ORDER — METFORMIN ER 500 MG 500 MG/1
500 TABLET ORAL
Qty: 90 | Refills: 1 | Status: ACTIVE | COMMUNITY
Start: 2021-09-13 | End: 1900-01-01

## 2024-02-22 ENCOUNTER — NON-APPOINTMENT (OUTPATIENT)
Age: 59
End: 2024-02-22

## 2024-02-22 DIAGNOSIS — R89.9 UNSPECIFIED ABNORMAL FINDING IN SPECIMENS FROM OTHER ORGANS, SYSTEMS AND TISSUES: ICD-10-CM

## 2024-02-22 LAB
25(OH)D3 SERPL-MCNC: 21.8 NG/ML
ALBUMIN SERPL ELPH-MCNC: 4.6 G/DL
ALP BLD-CCNC: 62 U/L
ALT SERPL-CCNC: 47 U/L
ANION GAP SERPL CALC-SCNC: 11 MMOL/L
APPEARANCE: CLEAR
AST SERPL-CCNC: 28 U/L
BASOPHILS # BLD AUTO: 0.06 K/UL
BASOPHILS NFR BLD AUTO: 1 %
BILIRUB SERPL-MCNC: 0.5 MG/DL
BILIRUBIN URINE: NEGATIVE
BLOOD URINE: NEGATIVE
BUN SERPL-MCNC: 23 MG/DL
CALCIUM SERPL-MCNC: 9.5 MG/DL
CHLORIDE SERPL-SCNC: 99 MMOL/L
CHOLEST SERPL-MCNC: 215 MG/DL
CO2 SERPL-SCNC: 28 MMOL/L
COLOR: YELLOW
CREAT SERPL-MCNC: 0.79 MG/DL
EGFR: 86 ML/MIN/1.73M2
EOSINOPHIL # BLD AUTO: 0.05 K/UL
EOSINOPHIL NFR BLD AUTO: 0.8 %
FOLATE SERPL-MCNC: 14.2 NG/ML
GLUCOSE QUALITATIVE U: NEGATIVE MG/DL
GLUCOSE SERPL-MCNC: 114 MG/DL
HCT VFR BLD CALC: 41.6 %
HDLC SERPL-MCNC: 69 MG/DL
HGB BLD-MCNC: 13.9 G/DL
IMM GRANULOCYTES NFR BLD AUTO: 0.3 %
KETONES URINE: NEGATIVE MG/DL
LDLC SERPL CALC-MCNC: 136 MG/DL
LEUKOCYTE ESTERASE URINE: ABNORMAL
LYMPHOCYTES # BLD AUTO: 1.66 K/UL
LYMPHOCYTES NFR BLD AUTO: 27.6 %
MAN DIFF?: NORMAL
MCHC RBC-ENTMCNC: 30.7 PG
MCHC RBC-ENTMCNC: 33.4 GM/DL
MCV RBC AUTO: 91.8 FL
MONOCYTES # BLD AUTO: 0.36 K/UL
MONOCYTES NFR BLD AUTO: 6 %
NEUTROPHILS # BLD AUTO: 3.87 K/UL
NEUTROPHILS NFR BLD AUTO: 64.3 %
NITRITE URINE: NEGATIVE
NONHDLC SERPL-MCNC: 146 MG/DL
PH URINE: 6
PLATELET # BLD AUTO: 219 K/UL
POTASSIUM SERPL-SCNC: 6.7 MMOL/L
PROT SERPL-MCNC: 7.1 G/DL
PROTEIN URINE: NEGATIVE MG/DL
RBC # BLD: 4.53 M/UL
RBC # FLD: 14 %
SODIUM SERPL-SCNC: 138 MMOL/L
SPECIFIC GRAVITY URINE: 1.02
T4 SERPL-MCNC: 8.3 UG/DL
TRIGL SERPL-MCNC: 57 MG/DL
TSH SERPL-ACNC: 1.46 UIU/ML
UROBILINOGEN URINE: 0.2 MG/DL
VIT B12 SERPL-MCNC: 670 PG/ML
WBC # FLD AUTO: 6.02 K/UL

## 2024-02-23 LAB — POTASSIUM SERPL-SCNC: 4.5 MMOL/L

## 2024-05-15 ENCOUNTER — RX RENEWAL (OUTPATIENT)
Age: 59
End: 2024-05-15

## 2024-05-15 RX ORDER — LOSARTAN POTASSIUM AND HYDROCHLOROTHIAZIDE 12.5; 5 MG/1; MG/1
50-12.5 TABLET ORAL
Qty: 90 | Refills: 3 | Status: ACTIVE | COMMUNITY
Start: 2020-11-17 | End: 1900-01-01

## 2024-05-22 ENCOUNTER — APPOINTMENT (OUTPATIENT)
Dept: FAMILY MEDICINE | Facility: CLINIC | Age: 59
End: 2024-05-22
Payer: COMMERCIAL

## 2024-05-22 VITALS
BODY MASS INDEX: 35.28 KG/M2 | TEMPERATURE: 98.3 F | DIASTOLIC BLOOD PRESSURE: 84 MMHG | HEART RATE: 70 BPM | SYSTOLIC BLOOD PRESSURE: 120 MMHG | OXYGEN SATURATION: 98 % | HEIGHT: 59 IN | WEIGHT: 175 LBS | RESPIRATION RATE: 14 BRPM

## 2024-05-22 DIAGNOSIS — R53.1 WEAKNESS: ICD-10-CM

## 2024-05-22 DIAGNOSIS — E11.9 TYPE 2 DIABETES MELLITUS W/OUT COMPLICATIONS: ICD-10-CM

## 2024-05-22 DIAGNOSIS — R07.89 OTHER CHEST PAIN: ICD-10-CM

## 2024-05-22 DIAGNOSIS — R42 DIZZINESS AND GIDDINESS: ICD-10-CM

## 2024-05-22 PROCEDURE — 83036 HEMOGLOBIN GLYCOSYLATED A1C: CPT | Mod: QW

## 2024-05-22 PROCEDURE — 82962 GLUCOSE BLOOD TEST: CPT

## 2024-05-22 PROCEDURE — 36415 COLL VENOUS BLD VENIPUNCTURE: CPT

## 2024-05-22 PROCEDURE — 99213 OFFICE O/P EST LOW 20 MIN: CPT

## 2024-05-22 NOTE — REVIEW OF SYSTEMS
[Vision Problems] : vision problems [Muscle Weakness] : muscle weakness [Negative] : Gastrointestinal [FreeTextEntry3] : Blurry vision

## 2024-05-22 NOTE — ASSESSMENT
[FreeTextEntry1] : 59 y/o female presenting to the office for diabetes check, medication refills and c/o episodes of weakness worsening the last 3 weeks.  ENDO: h/o T2DM -A1C 54--> 6.8 POCT today -POCT  -Continue Metformin  mg daily -Diet and exercise discussed -Rx for Freestyle meter provided -Check FBS daily and 2 x throughout the day, RTO in 1 month for follow up.

## 2024-05-22 NOTE — HISTORY OF PRESENT ILLNESS
[FreeTextEntry1] : Weakness, diabetes check. [de-identified] : 57 y/o female presenting to the office for diabetes check, medication refills and c/o feelings of weakness, fatigue and hungriness and blurry vision episodes that have become more noticeable the past 3 weeks. Pt does not check her blood sugars, last A1c 1 year ago.

## 2024-05-22 NOTE — HEALTH RISK ASSESSMENT
[No] : In the past 12 months have you used drugs other than those required for medical reasons? No [With Patient/Caregiver] : , with patient/caregiver [Reviewed updated] : Reviewed, updated [Aggressive treatment] : aggressive treatment [AdvancecareDate] : 05/24 [Never] : Never

## 2024-05-30 ENCOUNTER — APPOINTMENT (OUTPATIENT)
Dept: FAMILY MEDICINE | Facility: CLINIC | Age: 59
End: 2024-05-30

## 2024-05-30 LAB — HBA1C MFR BLD HPLC: 6.8

## 2024-06-24 ENCOUNTER — APPOINTMENT (OUTPATIENT)
Dept: FAMILY MEDICINE | Facility: CLINIC | Age: 59
End: 2024-06-24
Payer: COMMERCIAL

## 2024-06-24 ENCOUNTER — RESULT CHARGE (OUTPATIENT)
Age: 59
End: 2024-06-24

## 2024-06-24 VITALS
SYSTOLIC BLOOD PRESSURE: 130 MMHG | OXYGEN SATURATION: 98 % | HEART RATE: 76 BPM | WEIGHT: 171 LBS | DIASTOLIC BLOOD PRESSURE: 80 MMHG | HEIGHT: 59 IN | BODY MASS INDEX: 34.47 KG/M2 | RESPIRATION RATE: 14 BRPM

## 2024-06-24 DIAGNOSIS — L30.9 DERMATITIS, UNSPECIFIED: ICD-10-CM

## 2024-06-24 DIAGNOSIS — E78.00 PURE HYPERCHOLESTEROLEMIA, UNSPECIFIED: ICD-10-CM

## 2024-06-24 DIAGNOSIS — E87.5 HYPERKALEMIA: ICD-10-CM

## 2024-06-24 DIAGNOSIS — Z78.0 ASYMPTOMATIC MENOPAUSAL STATE: ICD-10-CM

## 2024-06-24 DIAGNOSIS — E11.9 TYPE 2 DIABETES MELLITUS W/OUT COMPLICATIONS: ICD-10-CM

## 2024-06-24 DIAGNOSIS — I10 ESSENTIAL (PRIMARY) HYPERTENSION: ICD-10-CM

## 2024-06-24 PROCEDURE — 99214 OFFICE O/P EST MOD 30 MIN: CPT

## 2024-06-24 PROCEDURE — G2211 COMPLEX E/M VISIT ADD ON: CPT | Mod: NC

## 2024-06-24 PROCEDURE — 83036 HEMOGLOBIN GLYCOSYLATED A1C: CPT | Mod: QW

## 2024-06-24 PROCEDURE — 36415 COLL VENOUS BLD VENIPUNCTURE: CPT

## 2024-06-24 RX ORDER — BLOOD-GLUCOSE METER
KIT MISCELLANEOUS
Qty: 1 | Refills: 0 | Status: ACTIVE | COMMUNITY
Start: 2024-05-22 | End: 1900-01-01

## 2024-06-24 RX ORDER — MUPIROCIN 20 MG/G
2 OINTMENT TOPICAL 3 TIMES DAILY
Qty: 1 | Refills: 0 | Status: ACTIVE | COMMUNITY
Start: 2024-06-24 | End: 1900-01-01

## 2024-06-24 RX ORDER — BLOOD SUGAR DIAGNOSTIC
STRIP MISCELLANEOUS
Qty: 2 | Refills: 0 | Status: ACTIVE | COMMUNITY
Start: 2024-05-22 | End: 1900-01-01

## 2024-06-24 RX ORDER — LANCETS 28 GAUGE
EACH MISCELLANEOUS
Qty: 2 | Refills: 0 | Status: ACTIVE | COMMUNITY
Start: 2024-05-22 | End: 1900-01-01

## 2024-06-25 LAB
ANION GAP SERPL CALC-SCNC: 14 MMOL/L
BUN SERPL-MCNC: 22 MG/DL
CALCIUM SERPL-MCNC: 9.4 MG/DL
CHLORIDE SERPL-SCNC: 103 MMOL/L
CO2 SERPL-SCNC: 25 MMOL/L
CREAT SERPL-MCNC: 0.76 MG/DL
EGFR: 90 ML/MIN/1.73M2
GLUCOSE SERPL-MCNC: 96 MG/DL
POTASSIUM SERPL-SCNC: 3.8 MMOL/L
SODIUM SERPL-SCNC: 142 MMOL/L

## 2024-08-07 ENCOUNTER — OFFICE (OUTPATIENT)
Dept: URBAN - METROPOLITAN AREA CLINIC 94 | Facility: CLINIC | Age: 59
Setting detail: OPHTHALMOLOGY
End: 2024-08-07
Payer: COMMERCIAL

## 2024-08-07 DIAGNOSIS — H52.4: ICD-10-CM

## 2024-08-07 DIAGNOSIS — H25.13: ICD-10-CM

## 2024-08-07 DIAGNOSIS — E11.9: ICD-10-CM

## 2024-08-07 DIAGNOSIS — H04.123: ICD-10-CM

## 2024-08-07 DIAGNOSIS — H35.033: ICD-10-CM

## 2024-08-07 PROCEDURE — 92002 INTRM OPH EXAM NEW PATIENT: CPT | Performed by: OPHTHALMOLOGY

## 2024-08-07 PROCEDURE — 92015 DETERMINE REFRACTIVE STATE: CPT | Performed by: OPHTHALMOLOGY

## 2024-08-07 PROCEDURE — 92250 FUNDUS PHOTOGRAPHY W/I&R: CPT | Performed by: OPHTHALMOLOGY

## 2024-08-07 ASSESSMENT — CONFRONTATIONAL VISUAL FIELD TEST (CVF)
OD_FINDINGS: FULL
OS_FINDINGS: FULL

## 2024-09-11 ENCOUNTER — APPOINTMENT (OUTPATIENT)
Dept: FAMILY MEDICINE | Facility: CLINIC | Age: 59
End: 2024-09-11
Payer: COMMERCIAL

## 2024-09-11 VITALS
DIASTOLIC BLOOD PRESSURE: 84 MMHG | BODY MASS INDEX: 34.47 KG/M2 | RESPIRATION RATE: 14 BRPM | HEIGHT: 59 IN | OXYGEN SATURATION: 98 % | HEART RATE: 77 BPM | WEIGHT: 171 LBS | SYSTOLIC BLOOD PRESSURE: 122 MMHG

## 2024-09-11 DIAGNOSIS — Z00.00 ENCOUNTER FOR GENERAL ADULT MEDICAL EXAMINATION W/OUT ABNORMAL FINDINGS: ICD-10-CM

## 2024-09-11 PROCEDURE — 36415 COLL VENOUS BLD VENIPUNCTURE: CPT

## 2024-09-11 PROCEDURE — 99396 PREV VISIT EST AGE 40-64: CPT

## 2024-09-11 PROCEDURE — 83036 HEMOGLOBIN GLYCOSYLATED A1C: CPT | Mod: QW

## 2024-09-11 RX ORDER — POLYETHYLENE GLYCOL 3350 17 G/17G
17 POWDER, FOR SOLUTION ORAL DAILY
Qty: 30 | Refills: 1 | Status: ACTIVE | COMMUNITY
Start: 2024-09-11 | End: 1900-01-01

## 2024-09-11 NOTE — HEALTH RISK ASSESSMENT
[No] : In the past 12 months have you used drugs other than those required for medical reasons? No [Never] : Never [With Patient/Caregiver] : , with patient/caregiver [Reviewed updated] : Reviewed, updated [Aggressive treatment] : aggressive treatment [Good] : ~his/her~  mood as  good [No falls in past year] : Patient reported no falls in the past year [0] : 2) Feeling down, depressed, or hopeless: Not at all (0) [PHQ-2 Negative - No further assessment needed] : PHQ-2 Negative - No further assessment needed [NO] : No [HIV test declined] : HIV test declined [Hepatitis C test declined] : Hepatitis C test declined [None] : None [With Family] : lives with family [# of Members in Household ___] :  household currently consist of [unfilled] member(s) [Employed] : employed [] :  [# Of Children ___] : has [unfilled] children [Feels Safe at Home] : Feels safe at home [Fully functional (bathing, dressing, toileting, transferring, walking, feeding)] : Fully functional (bathing, dressing, toileting, transferring, walking, feeding) [Fully functional (using the telephone, shopping, preparing meals, housekeeping, doing laundry, using] : Fully functional and needs no help or supervision to perform IADLs (using the telephone, shopping, preparing meals, housekeeping, doing laundry, using transportation, managing medications and managing finances) [Smoke Detector] : smoke detector [Carbon Monoxide Detector] : carbon monoxide detector [Seat Belt] :  uses seat belt [Sunscreen] : uses sunscreen [de-identified] : none [de-identified] : avoids carbohydrates [FNO0Alpaa] : 0 [Change in mental status noted] : No change in mental status noted [Language] : denies difficulty with language [Behavior] : denies difficulty with behavior [Learning/Retaining New Information] : denies difficulty learning/retaining new information [Handling Complex Tasks] : denies difficulty handling complex tasks [Reasoning] : denies difficulty with reasoning [Spatial Ability and Orientation] : denies difficulty with spatial ability and orientation [Sexually Active] : not sexually active [High Risk Behavior] : no high risk behavior [Reports changes in hearing] : Reports no changes in hearing [Reports changes in vision] : Reports no changes in vision [MammogramDate] : 10/22 [MammogramComments] : Bi-rads 2 [PapSmearDate] : 10/22 [ColonoscopyDate] : 3/16 [ColonoscopyComments] : Repeat 2026, Dr Carrero [FreeTextEntry2] :  [AdvancecareDate] : 05/24

## 2024-09-11 NOTE — HISTORY OF PRESENT ILLNESS
[FreeTextEntry1] : CPE [de-identified] : 57 y/o female with PMHx of HTN, GERD, T2DM presenting to the office for CPE. Pt states that when she takes the Metformin she feels very weak.

## 2024-09-11 NOTE — HISTORY OF PRESENT ILLNESS
[FreeTextEntry1] : CPE [de-identified] : 57 y/o female with PMHx of HTN, GERD, T2DM presenting to the office for CPE. Pt states that when she takes the Metformin she feels very weak.

## 2024-09-11 NOTE — ASSESSMENT
[FreeTextEntry1] : 57 y/o female with h/o HTN, T2DM presenting to the office for CPE  ENDO: h/o T2DM -A1C 5.4--> 6.8 --> 6.8 --> 6.7 POCT today -Discontinue Metformin and start Januvia 100 mg daily. -Diet and exercise discussed -Check FBS daily and 2 x throughout the day, RTO in 3 months for follow up.  CVS: HTN, HLD -Continue Losartan HCTZ 100/12.5 -Blood pressure under control  GI: Constipation -Start MiraLax daily -Colonoscopy 2016 with Dr Carrero, normal  HCM: -Fasting Blood work in house -Mammogram referral provided  -GYN referral provided -Bone density referral provided, needs P.A.

## 2024-09-11 NOTE — HEALTH RISK ASSESSMENT
[No] : In the past 12 months have you used drugs other than those required for medical reasons? No [Never] : Never [With Patient/Caregiver] : , with patient/caregiver [Reviewed updated] : Reviewed, updated [Aggressive treatment] : aggressive treatment [Good] : ~his/her~  mood as  good [No falls in past year] : Patient reported no falls in the past year [0] : 2) Feeling down, depressed, or hopeless: Not at all (0) [PHQ-2 Negative - No further assessment needed] : PHQ-2 Negative - No further assessment needed [NO] : No [HIV test declined] : HIV test declined [Hepatitis C test declined] : Hepatitis C test declined [None] : None [With Family] : lives with family [# of Members in Household ___] :  household currently consist of [unfilled] member(s) [Employed] : employed [] :  [# Of Children ___] : has [unfilled] children [Feels Safe at Home] : Feels safe at home [Fully functional (bathing, dressing, toileting, transferring, walking, feeding)] : Fully functional (bathing, dressing, toileting, transferring, walking, feeding) [Fully functional (using the telephone, shopping, preparing meals, housekeeping, doing laundry, using] : Fully functional and needs no help or supervision to perform IADLs (using the telephone, shopping, preparing meals, housekeeping, doing laundry, using transportation, managing medications and managing finances) [Smoke Detector] : smoke detector [Carbon Monoxide Detector] : carbon monoxide detector [Seat Belt] :  uses seat belt [Sunscreen] : uses sunscreen [de-identified] : none [de-identified] : avoids carbohydrates [UWT0Getwg] : 0 [Change in mental status noted] : No change in mental status noted [Language] : denies difficulty with language [Behavior] : denies difficulty with behavior [Learning/Retaining New Information] : denies difficulty learning/retaining new information [Handling Complex Tasks] : denies difficulty handling complex tasks [Reasoning] : denies difficulty with reasoning [Spatial Ability and Orientation] : denies difficulty with spatial ability and orientation [Sexually Active] : not sexually active [High Risk Behavior] : no high risk behavior [Reports changes in hearing] : Reports no changes in hearing [Reports changes in vision] : Reports no changes in vision [MammogramDate] : 10/22 [MammogramComments] : Bi-rads 2 [PapSmearDate] : 10/22 [ColonoscopyDate] : 3/16 [ColonoscopyComments] : Repeat 2026, Dr Carrero [FreeTextEntry2] :  [AdvancecareDate] : 05/24

## 2024-09-11 NOTE — PAST MEDICAL HISTORY
[Postmenopausal] : postmenopausal [Menarche Age ____] : age at menarche was [unfilled] [Menopause Age____] : age at menopause was [unfilled] [Total Preg ___] : G[unfilled] [Live Births ___] : P[unfilled]  [Full Term ___] : Full Term: [unfilled] [Premature ___] : Premature: [unfilled] [Living ___] : Living: [unfilled] [AB Spont ___] : miscarriages: [unfilled]

## 2024-09-12 RX ORDER — SITAGLIPTIN 100 MG/1
100 TABLET, FILM COATED ORAL DAILY
Qty: 90 | Refills: 1 | Status: DISCONTINUED | COMMUNITY
Start: 2024-09-11 | End: 2024-09-12

## 2024-09-14 ENCOUNTER — RESULT REVIEW (OUTPATIENT)
Age: 59
End: 2024-09-14

## 2024-09-14 ENCOUNTER — APPOINTMENT (OUTPATIENT)
Dept: RADIOLOGY | Facility: CLINIC | Age: 59
End: 2024-09-14
Payer: COMMERCIAL

## 2024-09-14 ENCOUNTER — OUTPATIENT (OUTPATIENT)
Dept: OUTPATIENT SERVICES | Facility: HOSPITAL | Age: 59
LOS: 1 days | End: 2024-09-14
Payer: COMMERCIAL

## 2024-09-14 ENCOUNTER — APPOINTMENT (OUTPATIENT)
Dept: MAMMOGRAPHY | Facility: CLINIC | Age: 59
End: 2024-09-14
Payer: COMMERCIAL

## 2024-09-14 DIAGNOSIS — Z00.8 ENCOUNTER FOR OTHER GENERAL EXAMINATION: ICD-10-CM

## 2024-09-14 DIAGNOSIS — Z78.0 ASYMPTOMATIC MENOPAUSAL STATE: ICD-10-CM

## 2024-09-14 DIAGNOSIS — Z12.39 ENCOUNTER FOR OTHER SCREENING FOR MALIGNANT NEOPLASM OF BREAST: ICD-10-CM

## 2024-09-14 DIAGNOSIS — Z90.49 ACQUIRED ABSENCE OF OTHER SPECIFIED PARTS OF DIGESTIVE TRACT: Chronic | ICD-10-CM

## 2024-09-14 PROCEDURE — 77080 DXA BONE DENSITY AXIAL: CPT | Mod: 26

## 2024-09-14 PROCEDURE — 77063 BREAST TOMOSYNTHESIS BI: CPT

## 2024-09-14 PROCEDURE — 77067 SCR MAMMO BI INCL CAD: CPT

## 2024-09-14 PROCEDURE — 77080 DXA BONE DENSITY AXIAL: CPT

## 2024-09-14 PROCEDURE — 77063 BREAST TOMOSYNTHESIS BI: CPT | Mod: 26

## 2024-09-14 PROCEDURE — 77067 SCR MAMMO BI INCL CAD: CPT | Mod: 26

## 2024-09-16 LAB
ALBUMIN SERPL ELPH-MCNC: 4.4 G/DL
ALP BLD-CCNC: 65 U/L
ALT SERPL-CCNC: 48 U/L
ANION GAP SERPL CALC-SCNC: 12 MMOL/L
APPEARANCE: CLEAR
AST SERPL-CCNC: 32 U/L
BACTERIA: NEGATIVE /HPF
BILIRUB SERPL-MCNC: 0.8 MG/DL
BILIRUBIN URINE: NEGATIVE
BLOOD URINE: NEGATIVE
BUN SERPL-MCNC: 18 MG/DL
CALCIUM SERPL-MCNC: 10 MG/DL
CAST: 1 /LPF
CHLORIDE SERPL-SCNC: 103 MMOL/L
CHOLEST SERPL-MCNC: 200 MG/DL
CO2 SERPL-SCNC: 26 MMOL/L
COLOR: YELLOW
CREAT SERPL-MCNC: 0.73 MG/DL
CREAT SPEC-SCNC: 127 MG/DL
EGFR: 94 ML/MIN/1.73M2
EPITHELIAL CELLS: 3 /HPF
GLUCOSE QUALITATIVE U: NEGATIVE MG/DL
GLUCOSE SERPL-MCNC: 103 MG/DL
HCT VFR BLD CALC: 41.6 %
HDLC SERPL-MCNC: 63 MG/DL
HGB BLD-MCNC: 13.8 G/DL
KETONES URINE: NEGATIVE MG/DL
LDLC SERPL CALC-MCNC: 127 MG/DL
LEUKOCYTE ESTERASE URINE: ABNORMAL
MCHC RBC-ENTMCNC: 30.7 PG
MCHC RBC-ENTMCNC: 33.2 GM/DL
MCV RBC AUTO: 92.4 FL
MICROALBUMIN 24H UR DL<=1MG/L-MCNC: <1.2 MG/DL
MICROALBUMIN/CREAT 24H UR-RTO: NORMAL MG/G
MICROSCOPIC-UA: NORMAL
NITRITE URINE: NEGATIVE
NONHDLC SERPL-MCNC: 138 MG/DL
PH URINE: 6
PLATELET # BLD AUTO: 194 K/UL
POTASSIUM SERPL-SCNC: 3.9 MMOL/L
PROT SERPL-MCNC: 7.2 G/DL
PROTEIN URINE: NEGATIVE MG/DL
RBC # BLD: 4.5 M/UL
RBC # FLD: 14.4 %
RED BLOOD CELLS URINE: 3 /HPF
SODIUM SERPL-SCNC: 141 MMOL/L
SPECIFIC GRAVITY URINE: 1.02
TRIGL SERPL-MCNC: 59 MG/DL
TSH SERPL-ACNC: 1.07 UIU/ML
UROBILINOGEN URINE: 0.2 MG/DL
WBC # FLD AUTO: 6.12 K/UL
WHITE BLOOD CELLS URINE: 1 /HPF

## 2024-09-16 RX ORDER — GLIPIZIDE 5 MG/1
5 TABLET ORAL
Qty: 180 | Refills: 0 | Status: ACTIVE | COMMUNITY
Start: 2024-09-12 | End: 1900-01-01

## 2024-12-16 ENCOUNTER — APPOINTMENT (OUTPATIENT)
Dept: FAMILY MEDICINE | Facility: CLINIC | Age: 59
End: 2024-12-16
Payer: COMMERCIAL

## 2024-12-16 ENCOUNTER — NON-APPOINTMENT (OUTPATIENT)
Age: 59
End: 2024-12-16

## 2024-12-16 VITALS
WEIGHT: 177 LBS | SYSTOLIC BLOOD PRESSURE: 128 MMHG | BODY MASS INDEX: 35.68 KG/M2 | DIASTOLIC BLOOD PRESSURE: 82 MMHG | TEMPERATURE: 98.1 F | HEIGHT: 59 IN | OXYGEN SATURATION: 98 % | HEART RATE: 75 BPM | RESPIRATION RATE: 14 BRPM

## 2024-12-16 DIAGNOSIS — E78.00 PURE HYPERCHOLESTEROLEMIA, UNSPECIFIED: ICD-10-CM

## 2024-12-16 DIAGNOSIS — E11.9 TYPE 2 DIABETES MELLITUS W/OUT COMPLICATIONS: ICD-10-CM

## 2024-12-16 DIAGNOSIS — I10 ESSENTIAL (PRIMARY) HYPERTENSION: ICD-10-CM

## 2024-12-16 DIAGNOSIS — K59.00 CONSTIPATION, UNSPECIFIED: ICD-10-CM

## 2024-12-16 DIAGNOSIS — J02.9 ACUTE PHARYNGITIS, UNSPECIFIED: ICD-10-CM

## 2024-12-16 PROCEDURE — G2211 COMPLEX E/M VISIT ADD ON: CPT | Mod: NC

## 2024-12-16 PROCEDURE — 99214 OFFICE O/P EST MOD 30 MIN: CPT

## 2024-12-16 PROCEDURE — 83036 HEMOGLOBIN GLYCOSYLATED A1C: CPT | Mod: QW

## 2024-12-16 RX ORDER — LORATADINE 10 MG
17 TABLET,DISINTEGRATING ORAL DAILY
Qty: 1 | Refills: 3 | Status: ACTIVE | COMMUNITY
Start: 2024-12-16 | End: 1900-01-01

## 2024-12-16 RX ORDER — AMOXICILLIN 500 MG/1
500 TABLET, FILM COATED ORAL
Qty: 10 | Refills: 0 | Status: ACTIVE | COMMUNITY
Start: 2024-12-16 | End: 1900-01-01

## 2025-01-30 ENCOUNTER — LABORATORY RESULT (OUTPATIENT)
Age: 60
End: 2025-01-30

## 2025-01-30 ENCOUNTER — APPOINTMENT (OUTPATIENT)
Dept: OBGYN | Facility: CLINIC | Age: 60
End: 2025-01-30
Payer: COMMERCIAL

## 2025-01-30 VITALS
BODY MASS INDEX: 36.89 KG/M2 | HEIGHT: 59 IN | WEIGHT: 183 LBS | SYSTOLIC BLOOD PRESSURE: 134 MMHG | DIASTOLIC BLOOD PRESSURE: 62 MMHG

## 2025-01-30 DIAGNOSIS — N95.2 POSTMENOPAUSAL ATROPHIC VAGINITIS: ICD-10-CM

## 2025-01-30 DIAGNOSIS — Z01.419 ENCOUNTER FOR GYNECOLOGICAL EXAMINATION (GENERAL) (ROUTINE) W/OUT ABNORMAL FINDINGS: ICD-10-CM

## 2025-01-30 PROCEDURE — 99396 PREV VISIT EST AGE 40-64: CPT

## 2025-01-30 RX ORDER — ESTRADIOL 0.1 MG/G
0.1 CREAM VAGINAL
Qty: 1 | Refills: 1 | Status: ACTIVE | COMMUNITY
Start: 2025-01-30 | End: 1900-01-01

## 2025-01-31 LAB
APPEARANCE: CLEAR
BACTERIA: NEGATIVE /HPF
BILIRUBIN URINE: NEGATIVE
BLOOD URINE: ABNORMAL
CAST: 0 /LPF
COLOR: NORMAL
EPITHELIAL CELLS: 4 /HPF
GLUCOSE QUALITATIVE U: NEGATIVE MG/DL
HPV HIGH+LOW RISK DNA PNL CVX: DETECTED
KETONES URINE: NEGATIVE MG/DL
LEUKOCYTE ESTERASE URINE: ABNORMAL
MICROSCOPIC-UA: NORMAL
NITRITE URINE: NEGATIVE
PH URINE: 6
PROTEIN URINE: NORMAL MG/DL
RED BLOOD CELLS URINE: 7 /HPF
SPECIFIC GRAVITY URINE: 1.02
UROBILINOGEN URINE: 0.2 MG/DL
WHITE BLOOD CELLS URINE: 44 /HPF

## 2025-02-02 LAB — BACTERIA UR CULT: NORMAL

## 2025-02-04 LAB — CYTOLOGY CVX/VAG DOC THIN PREP: ABNORMAL

## 2025-03-03 ENCOUNTER — APPOINTMENT (OUTPATIENT)
Dept: OBGYN | Facility: CLINIC | Age: 60
End: 2025-03-03
Payer: COMMERCIAL

## 2025-03-03 ENCOUNTER — LABORATORY RESULT (OUTPATIENT)
Age: 60
End: 2025-03-03

## 2025-03-03 VITALS
DIASTOLIC BLOOD PRESSURE: 70 MMHG | SYSTOLIC BLOOD PRESSURE: 122 MMHG | HEIGHT: 59 IN | BODY MASS INDEX: 37.29 KG/M2 | WEIGHT: 185 LBS

## 2025-03-03 DIAGNOSIS — B97.7 PAPILLOMAVIRUS AS THE CAUSE OF DISEASES CLASSIFIED ELSEWHERE: ICD-10-CM

## 2025-03-03 PROCEDURE — 57454 BX/CURETT OF CERVIX W/SCOPE: CPT

## 2025-03-17 ENCOUNTER — APPOINTMENT (OUTPATIENT)
Dept: FAMILY MEDICINE | Facility: CLINIC | Age: 60
End: 2025-03-17
Payer: COMMERCIAL

## 2025-03-17 VITALS
RESPIRATION RATE: 14 BRPM | DIASTOLIC BLOOD PRESSURE: 78 MMHG | HEIGHT: 59 IN | OXYGEN SATURATION: 98 % | SYSTOLIC BLOOD PRESSURE: 126 MMHG | BODY MASS INDEX: 36.69 KG/M2 | HEART RATE: 80 BPM | WEIGHT: 182 LBS

## 2025-03-17 DIAGNOSIS — E66.9 OBESITY, UNSPECIFIED: ICD-10-CM

## 2025-03-17 DIAGNOSIS — R87.610 ATYPICAL SQUAMOUS CELLS OF UNDETERMINED SIGNIFICANCE ON CYTOLOGIC SMEAR OF CERVIX (ASC-US): ICD-10-CM

## 2025-03-17 DIAGNOSIS — K59.00 CONSTIPATION, UNSPECIFIED: ICD-10-CM

## 2025-03-17 DIAGNOSIS — E87.5 HYPERKALEMIA: ICD-10-CM

## 2025-03-17 DIAGNOSIS — I10 ESSENTIAL (PRIMARY) HYPERTENSION: ICD-10-CM

## 2025-03-17 DIAGNOSIS — E78.00 PURE HYPERCHOLESTEROLEMIA, UNSPECIFIED: ICD-10-CM

## 2025-03-17 DIAGNOSIS — D21.9 BENIGN NEOPLASM OF CONNECTIVE AND OTHER SOFT TISSUE, UNSPECIFIED: ICD-10-CM

## 2025-03-17 DIAGNOSIS — K21.9 GASTRO-ESOPHAGEAL REFLUX DISEASE W/OUT ESOPHAGITIS: ICD-10-CM

## 2025-03-17 DIAGNOSIS — E11.9 TYPE 2 DIABETES MELLITUS W/OUT COMPLICATIONS: ICD-10-CM

## 2025-03-17 DIAGNOSIS — R87.810 ATYPICAL SQUAMOUS CELLS OF UNDETERMINED SIGNIFICANCE ON CYTOLOGIC SMEAR OF CERVIX (ASC-US): ICD-10-CM

## 2025-03-17 PROCEDURE — 36415 COLL VENOUS BLD VENIPUNCTURE: CPT

## 2025-03-17 PROCEDURE — 99214 OFFICE O/P EST MOD 30 MIN: CPT

## 2025-03-17 PROCEDURE — 83036 HEMOGLOBIN GLYCOSYLATED A1C: CPT | Mod: QW

## 2025-03-17 PROCEDURE — G2211 COMPLEX E/M VISIT ADD ON: CPT | Mod: NC

## 2025-03-17 RX ORDER — SEMAGLUTIDE 0.68 MG/ML
2 INJECTION, SOLUTION SUBCUTANEOUS
Qty: 2 | Refills: 0 | Status: ACTIVE | COMMUNITY
Start: 2025-03-17 | End: 1900-01-01

## 2025-03-18 LAB
ALBUMIN SERPL ELPH-MCNC: 4.1 G/DL
ALP BLD-CCNC: 74 U/L
ALT SERPL-CCNC: 60 U/L
ANION GAP SERPL CALC-SCNC: 11 MMOL/L
AST SERPL-CCNC: 43 U/L
BILIRUB SERPL-MCNC: 0.6 MG/DL
BUN SERPL-MCNC: 17 MG/DL
CALCIUM SERPL-MCNC: 9.2 MG/DL
CHLORIDE SERPL-SCNC: 104 MMOL/L
CO2 SERPL-SCNC: 28 MMOL/L
CREAT SERPL-MCNC: 0.86 MG/DL
EGFRCR SERPLBLD CKD-EPI 2021: 77 ML/MIN/1.73M2
GLUCOSE SERPL-MCNC: 107 MG/DL
POTASSIUM SERPL-SCNC: 3.7 MMOL/L
PROT SERPL-MCNC: 6.7 G/DL
SODIUM SERPL-SCNC: 142 MMOL/L

## 2025-04-07 ENCOUNTER — APPOINTMENT (OUTPATIENT)
Dept: OBGYN | Facility: CLINIC | Age: 60
End: 2025-04-07
Payer: COMMERCIAL

## 2025-04-07 ENCOUNTER — LABORATORY RESULT (OUTPATIENT)
Age: 60
End: 2025-04-07

## 2025-04-07 VITALS
HEIGHT: 59 IN | SYSTOLIC BLOOD PRESSURE: 122 MMHG | DIASTOLIC BLOOD PRESSURE: 74 MMHG | BODY MASS INDEX: 36.69 KG/M2 | WEIGHT: 182 LBS

## 2025-04-07 DIAGNOSIS — D06.9 CARCINOMA IN SITU OF CERVIX, UNSPECIFIED: ICD-10-CM

## 2025-04-07 PROCEDURE — 57460 BX OF CERVIX W/SCOPE LEEP: CPT

## 2025-05-12 ENCOUNTER — RX RENEWAL (OUTPATIENT)
Age: 60
End: 2025-05-12

## 2025-06-23 ENCOUNTER — APPOINTMENT (OUTPATIENT)
Dept: FAMILY MEDICINE | Facility: CLINIC | Age: 60
End: 2025-06-23
Payer: COMMERCIAL

## 2025-06-23 ENCOUNTER — LABORATORY RESULT (OUTPATIENT)
Age: 60
End: 2025-06-23

## 2025-06-23 ENCOUNTER — RESULT REVIEW (OUTPATIENT)
Age: 60
End: 2025-06-23

## 2025-06-23 ENCOUNTER — OUTPATIENT (OUTPATIENT)
Dept: OUTPATIENT SERVICES | Facility: HOSPITAL | Age: 60
LOS: 1 days | End: 2025-06-23
Payer: COMMERCIAL

## 2025-06-23 ENCOUNTER — APPOINTMENT (OUTPATIENT)
Dept: RADIOLOGY | Facility: CLINIC | Age: 60
End: 2025-06-23
Payer: COMMERCIAL

## 2025-06-23 VITALS
HEIGHT: 59 IN | TEMPERATURE: 98.3 F | SYSTOLIC BLOOD PRESSURE: 124 MMHG | OXYGEN SATURATION: 97 % | RESPIRATION RATE: 14 BRPM | DIASTOLIC BLOOD PRESSURE: 72 MMHG | HEART RATE: 76 BPM | BODY MASS INDEX: 36.49 KG/M2 | WEIGHT: 181 LBS

## 2025-06-23 DIAGNOSIS — Z90.49 ACQUIRED ABSENCE OF OTHER SPECIFIED PARTS OF DIGESTIVE TRACT: Chronic | ICD-10-CM

## 2025-06-23 DIAGNOSIS — M25.552 PAIN IN LEFT HIP: ICD-10-CM

## 2025-06-23 DIAGNOSIS — M25.561 PAIN IN RIGHT KNEE: ICD-10-CM

## 2025-06-23 PROBLEM — Z87.898 HISTORY OF WEAKNESS: Status: RESOLVED | Noted: 2024-05-22 | Resolved: 2025-06-23

## 2025-06-23 PROBLEM — Z01.818 PREOPERATIVE EXAM FOR GYNECOLOGIC SURGERY: Status: RESOLVED | Noted: 2023-05-22 | Resolved: 2025-06-23

## 2025-06-23 PROBLEM — R73.03 PREDIABETES: Status: RESOLVED | Noted: 2019-12-16 | Resolved: 2025-06-23

## 2025-06-23 PROBLEM — M65.20 CALCIFIC TENDONITIS: Status: RESOLVED | Noted: 2023-03-01 | Resolved: 2025-06-23

## 2025-06-23 PROBLEM — R31.29 MICROSCOPIC HEMATURIA: Status: RESOLVED | Noted: 2017-04-13 | Resolved: 2025-06-23

## 2025-06-23 PROBLEM — Z87.39 HISTORY OF NECK PAIN: Status: RESOLVED | Noted: 2018-10-10 | Resolved: 2025-06-23

## 2025-06-23 PROBLEM — Z87.2 HISTORY OF DERMATITIS: Status: RESOLVED | Noted: 2024-06-24 | Resolved: 2025-06-23

## 2025-06-23 PROBLEM — R42 DIZZINESS, NONSPECIFIC: Status: RESOLVED | Noted: 2018-10-10 | Resolved: 2025-06-23

## 2025-06-23 PROBLEM — R79.89 ABNORMAL LFTS: Status: ACTIVE | Noted: 2019-12-16

## 2025-06-23 PROBLEM — Z09 POSTOP CHECK: Status: RESOLVED | Noted: 2023-06-12 | Resolved: 2025-06-23

## 2025-06-23 PROBLEM — Z12.39 BREAST CANCER SCREENING: Status: RESOLVED | Noted: 2021-02-08 | Resolved: 2025-06-23

## 2025-06-23 PROBLEM — Z86.39 HISTORY OF HYPERKALEMIA: Status: RESOLVED | Noted: 2024-06-24 | Resolved: 2025-06-23

## 2025-06-23 PROBLEM — R53.81 MALAISE AND FATIGUE: Status: RESOLVED | Noted: 2018-10-10 | Resolved: 2025-06-23

## 2025-06-23 PROBLEM — Z48.89 POSTOPERATIVE VISIT: Status: RESOLVED | Noted: 2023-06-12 | Resolved: 2025-06-23

## 2025-06-23 PROBLEM — M54.42 ACUTE MIDLINE LOW BACK PAIN WITH LEFT-SIDED SCIATICA: Status: RESOLVED | Noted: 2020-10-12 | Resolved: 2025-06-23

## 2025-06-23 PROBLEM — R89.9 ABNORMAL LABORATORY TEST RESULT: Status: RESOLVED | Noted: 2024-02-22 | Resolved: 2025-06-23

## 2025-06-23 PROBLEM — N39.0 RECURRENT UTI: Status: RESOLVED | Noted: 2018-06-18 | Resolved: 2025-06-23

## 2025-06-23 PROBLEM — Z78.0 POSTMENOPAUSAL: Status: RESOLVED | Noted: 2024-06-24 | Resolved: 2025-06-23

## 2025-06-23 PROBLEM — Z87.898 HISTORY OF PALPITATIONS: Status: RESOLVED | Noted: 2018-04-26 | Resolved: 2025-06-23

## 2025-06-23 PROCEDURE — 73502 X-RAY EXAM HIP UNI 2-3 VIEWS: CPT

## 2025-06-23 PROCEDURE — 73564 X-RAY EXAM KNEE 4 OR MORE: CPT

## 2025-06-23 PROCEDURE — 73502 X-RAY EXAM HIP UNI 2-3 VIEWS: CPT | Mod: 26,LT

## 2025-06-23 PROCEDURE — 99214 OFFICE O/P EST MOD 30 MIN: CPT

## 2025-06-23 PROCEDURE — 36415 COLL VENOUS BLD VENIPUNCTURE: CPT

## 2025-06-23 PROCEDURE — 73564 X-RAY EXAM KNEE 4 OR MORE: CPT | Mod: 26,RT

## 2025-06-23 PROCEDURE — 83036 HEMOGLOBIN GLYCOSYLATED A1C: CPT | Mod: QW

## 2025-06-23 PROCEDURE — G2211 COMPLEX E/M VISIT ADD ON: CPT | Mod: NC

## 2025-06-24 LAB
ALBUMIN SERPL ELPH-MCNC: 4.3 G/DL
ALP BLD-CCNC: 76 U/L
ALT SERPL-CCNC: 54 U/L
ANION GAP SERPL CALC-SCNC: 14 MMOL/L
AST SERPL-CCNC: 35 U/L
BILIRUB SERPL-MCNC: 0.5 MG/DL
BUN SERPL-MCNC: 24 MG/DL
CALCIUM SERPL-MCNC: 9.2 MG/DL
CHLORIDE SERPL-SCNC: 104 MMOL/L
CO2 SERPL-SCNC: 23 MMOL/L
CREAT SERPL-MCNC: 0.71 MG/DL
EGFRCR SERPLBLD CKD-EPI 2021: 97 ML/MIN/1.73M2
GLUCOSE SERPL-MCNC: 110 MG/DL
POTASSIUM SERPL-SCNC: 3.6 MMOL/L
PROT SERPL-MCNC: 7 G/DL
SODIUM SERPL-SCNC: 141 MMOL/L

## 2025-07-22 DIAGNOSIS — M17.0 BILATERAL PRIMARY OSTEOARTHRITIS OF KNEE: ICD-10-CM

## 2025-08-08 ENCOUNTER — APPOINTMENT (OUTPATIENT)
Dept: ORTHOPEDIC SURGERY | Facility: CLINIC | Age: 60
End: 2025-08-08

## 2025-08-08 VITALS
BODY MASS INDEX: 36.49 KG/M2 | HEART RATE: 80 BPM | HEIGHT: 59 IN | SYSTOLIC BLOOD PRESSURE: 113 MMHG | DIASTOLIC BLOOD PRESSURE: 73 MMHG | WEIGHT: 181 LBS

## 2025-08-08 DIAGNOSIS — M17.11 UNILATERAL PRIMARY OSTEOARTHRITIS, RIGHT KNEE: ICD-10-CM

## 2025-08-08 DIAGNOSIS — M65.252 CALCIFIC TENDINITIS, LEFT THIGH: ICD-10-CM

## 2025-08-13 ENCOUNTER — TRANSCRIPTION ENCOUNTER (OUTPATIENT)
Age: 60
End: 2025-08-13

## 2025-08-18 ENCOUNTER — LABORATORY RESULT (OUTPATIENT)
Age: 60
End: 2025-08-18

## 2025-08-18 ENCOUNTER — APPOINTMENT (OUTPATIENT)
Dept: OBGYN | Facility: CLINIC | Age: 60
End: 2025-08-18
Payer: COMMERCIAL

## 2025-08-18 VITALS — DIASTOLIC BLOOD PRESSURE: 70 MMHG | SYSTOLIC BLOOD PRESSURE: 122 MMHG

## 2025-08-18 DIAGNOSIS — N87.9 DYSPLASIA OF CERVIX UTERI, UNSPECIFIED: ICD-10-CM

## 2025-08-18 DIAGNOSIS — R39.9 UNSPECIFIED SYMPTOMS AND SIGNS INVOLVING THE GENITOURINARY SYSTEM: ICD-10-CM

## 2025-08-18 PROCEDURE — 99214 OFFICE O/P EST MOD 30 MIN: CPT

## 2025-08-19 LAB
APPEARANCE: CLEAR
BILIRUBIN URINE: NEGATIVE
BLOOD URINE: ABNORMAL
COLOR: YELLOW
GLUCOSE QUALITATIVE U: NEGATIVE MG/DL
KETONES URINE: NEGATIVE MG/DL
LEUKOCYTE ESTERASE URINE: ABNORMAL
NITRITE URINE: NEGATIVE
PH URINE: 6.5
PROTEIN URINE: NEGATIVE MG/DL
SPECIFIC GRAVITY URINE: 1.03
UROBILINOGEN URINE: 0.2 MG/DL

## 2025-08-21 LAB
BACTERIA UR CULT: NORMAL
CYTOLOGY CVX/VAG DOC THIN PREP: ABNORMAL
HPV HIGH+LOW RISK DNA PNL CVX: DETECTED

## 2025-09-18 ENCOUNTER — APPOINTMENT (OUTPATIENT)
Dept: ORTHOPEDIC SURGERY | Facility: CLINIC | Age: 60
End: 2025-09-18
Payer: COMMERCIAL

## 2025-09-18 VITALS
HEART RATE: 75 BPM | DIASTOLIC BLOOD PRESSURE: 79 MMHG | SYSTOLIC BLOOD PRESSURE: 136 MMHG | HEIGHT: 59 IN | BODY MASS INDEX: 36.49 KG/M2 | WEIGHT: 181 LBS

## 2025-09-18 DIAGNOSIS — M17.11 UNILATERAL PRIMARY OSTEOARTHRITIS, RIGHT KNEE: ICD-10-CM

## 2025-09-18 PROCEDURE — 99215 OFFICE O/P EST HI 40 MIN: CPT

## 2025-09-18 PROCEDURE — G2211 COMPLEX E/M VISIT ADD ON: CPT | Mod: NC

## 2025-09-18 RX ORDER — MELOXICAM 15 MG/1
15 TABLET ORAL
Qty: 30 | Refills: 2 | Status: ACTIVE | COMMUNITY
Start: 2025-09-18 | End: 1900-01-01